# Patient Record
Sex: FEMALE | Race: WHITE | Employment: OTHER | URBAN - METROPOLITAN AREA
[De-identification: names, ages, dates, MRNs, and addresses within clinical notes are randomized per-mention and may not be internally consistent; named-entity substitution may affect disease eponyms.]

---

## 2017-04-21 ENCOUNTER — HOSPITAL ENCOUNTER (EMERGENCY)
Age: 82
Discharge: HOME | End: 2017-04-21
Attending: EMERGENCY MEDICINE
Payer: COMMERCIAL

## 2017-04-21 ENCOUNTER — APPOINTMENT (OUTPATIENT)
Dept: GENERAL RADIOLOGY | Age: 82
End: 2017-04-21
Attending: EMERGENCY MEDICINE
Payer: COMMERCIAL

## 2017-04-21 VITALS
DIASTOLIC BLOOD PRESSURE: 77 MMHG | BODY MASS INDEX: 29.41 KG/M2 | RESPIRATION RATE: 20 BRPM | HEIGHT: 66 IN | WEIGHT: 182.98 LBS | HEART RATE: 67 BPM | TEMPERATURE: 100.2 F | SYSTOLIC BLOOD PRESSURE: 127 MMHG | OXYGEN SATURATION: 95 %

## 2017-04-21 DIAGNOSIS — J06.9 ACUTE UPPER RESPIRATORY INFECTION: Primary | ICD-10-CM

## 2017-04-21 DIAGNOSIS — E86.0 DEHYDRATION: ICD-10-CM

## 2017-04-21 LAB
ALBUMIN SERPL BCP-MCNC: 3.8 G/DL (ref 3.5–5)
ALBUMIN/GLOB SERPL: 1.4 {RATIO} (ref 1.1–2.2)
ALP SERPL-CCNC: 80 U/L (ref 45–117)
ALT SERPL-CCNC: 22 U/L (ref 12–78)
ANION GAP BLD CALC-SCNC: 9 MMOL/L (ref 5–15)
APPEARANCE UR: CLEAR
AST SERPL W P-5'-P-CCNC: 16 U/L (ref 15–37)
BACTERIA URNS QL MICRO: NEGATIVE /HPF
BASOPHILS # BLD AUTO: 0.1 K/UL (ref 0–0.1)
BASOPHILS # BLD: 1 % (ref 0–1)
BILIRUB SERPL-MCNC: 0.6 MG/DL (ref 0.2–1)
BILIRUB UR QL: NEGATIVE
BUN SERPL-MCNC: 26 MG/DL (ref 6–20)
BUN/CREAT SERPL: 17 (ref 12–20)
CALCIUM SERPL-MCNC: 9.3 MG/DL (ref 8.5–10.1)
CHLORIDE SERPL-SCNC: 106 MMOL/L (ref 97–108)
CK SERPL-CCNC: 73 U/L (ref 26–192)
CO2 SERPL-SCNC: 24 MMOL/L (ref 21–32)
COLOR UR: ABNORMAL
CREAT SERPL-MCNC: 1.51 MG/DL (ref 0.55–1.02)
EOSINOPHIL # BLD: 0.1 K/UL (ref 0–0.4)
EOSINOPHIL NFR BLD: 1 % (ref 0–7)
EPITH CASTS URNS QL MICRO: ABNORMAL /LPF
ERYTHROCYTE [DISTWIDTH] IN BLOOD BY AUTOMATED COUNT: 13.9 % (ref 11.5–14.5)
FLUAV AG NPH QL IA: NEGATIVE
FLUBV AG NOSE QL IA: NEGATIVE
GLOBULIN SER CALC-MCNC: 2.8 G/DL (ref 2–4)
GLUCOSE SERPL-MCNC: 108 MG/DL (ref 65–100)
GLUCOSE UR STRIP.AUTO-MCNC: NEGATIVE MG/DL
HCT VFR BLD AUTO: 35.1 % (ref 35–47)
HGB BLD-MCNC: 11.6 G/DL (ref 11.5–16)
HGB UR QL STRIP: NEGATIVE
KETONES UR QL STRIP.AUTO: NEGATIVE MG/DL
LEUKOCYTE ESTERASE UR QL STRIP.AUTO: ABNORMAL
LYMPHOCYTES # BLD AUTO: 11 % (ref 12–49)
LYMPHOCYTES # BLD: 0.7 K/UL (ref 0.8–3.5)
MCH RBC QN AUTO: 31.2 PG (ref 26–34)
MCHC RBC AUTO-ENTMCNC: 33 G/DL (ref 30–36.5)
MCV RBC AUTO: 94.4 FL (ref 80–99)
MONOCYTES # BLD: 1 K/UL (ref 0–1)
MONOCYTES NFR BLD AUTO: 16 % (ref 5–13)
NEUTS SEG # BLD: 4.3 K/UL (ref 1.8–8)
NEUTS SEG NFR BLD AUTO: 71 % (ref 32–75)
NITRITE UR QL STRIP.AUTO: NEGATIVE
PH UR STRIP: 5.5 [PH] (ref 5–8)
PLATELET # BLD AUTO: 189 K/UL (ref 150–400)
POTASSIUM SERPL-SCNC: 4.3 MMOL/L (ref 3.5–5.1)
PROT SERPL-MCNC: 6.6 G/DL (ref 6.4–8.2)
PROT UR STRIP-MCNC: NEGATIVE MG/DL
RBC # BLD AUTO: 3.72 M/UL (ref 3.8–5.2)
RBC #/AREA URNS HPF: ABNORMAL /HPF (ref 0–5)
RBC MORPH BLD: ABNORMAL
SODIUM SERPL-SCNC: 139 MMOL/L (ref 136–145)
SP GR UR REFRACTOMETRY: 1.02 (ref 1–1.03)
TROPONIN I SERPL-MCNC: 0.04 NG/ML
UA: UC IF INDICATED,UAUC: ABNORMAL
UROBILINOGEN UR QL STRIP.AUTO: 0.2 EU/DL (ref 0.2–1)
WBC # BLD AUTO: 6.2 K/UL (ref 3.6–11)
WBC URNS QL MICRO: ABNORMAL /HPF (ref 0–4)

## 2017-04-21 PROCEDURE — 84484 ASSAY OF TROPONIN QUANT: CPT | Performed by: EMERGENCY MEDICINE

## 2017-04-21 PROCEDURE — 36415 COLL VENOUS BLD VENIPUNCTURE: CPT | Performed by: EMERGENCY MEDICINE

## 2017-04-21 PROCEDURE — 87804 INFLUENZA ASSAY W/OPTIC: CPT | Performed by: EMERGENCY MEDICINE

## 2017-04-21 PROCEDURE — 82550 ASSAY OF CK (CPK): CPT | Performed by: EMERGENCY MEDICINE

## 2017-04-21 PROCEDURE — 85025 COMPLETE CBC W/AUTO DIFF WBC: CPT | Performed by: EMERGENCY MEDICINE

## 2017-04-21 PROCEDURE — 74011000250 HC RX REV CODE- 250: Performed by: EMERGENCY MEDICINE

## 2017-04-21 PROCEDURE — 96360 HYDRATION IV INFUSION INIT: CPT

## 2017-04-21 PROCEDURE — 80053 COMPREHEN METABOLIC PANEL: CPT | Performed by: EMERGENCY MEDICINE

## 2017-04-21 PROCEDURE — 77030029684 HC NEB SM VOL KT MONA -A

## 2017-04-21 PROCEDURE — 99283 EMERGENCY DEPT VISIT LOW MDM: CPT

## 2017-04-21 PROCEDURE — 71020 XR CHEST PA LAT: CPT

## 2017-04-21 PROCEDURE — 93005 ELECTROCARDIOGRAM TRACING: CPT

## 2017-04-21 PROCEDURE — 81001 URINALYSIS AUTO W/SCOPE: CPT

## 2017-04-21 PROCEDURE — 94640 AIRWAY INHALATION TREATMENT: CPT

## 2017-04-21 PROCEDURE — 87086 URINE CULTURE/COLONY COUNT: CPT

## 2017-04-21 PROCEDURE — 74011250636 HC RX REV CODE- 250/636: Performed by: EMERGENCY MEDICINE

## 2017-04-21 RX ORDER — ALBUTEROL SULFATE 90 UG/1
2 AEROSOL, METERED RESPIRATORY (INHALATION)
Qty: 1 INHALER | Refills: 0 | Status: ON HOLD | OUTPATIENT
Start: 2017-04-21 | End: 2018-02-27

## 2017-04-21 RX ORDER — TRAZODONE HYDROCHLORIDE 50 MG/1
50 TABLET ORAL
COMMUNITY
End: 2019-06-15 | Stop reason: SDUPTHER

## 2017-04-21 RX ORDER — ALBUTEROL SULFATE 0.83 MG/ML
5 SOLUTION RESPIRATORY (INHALATION)
Status: COMPLETED | OUTPATIENT
Start: 2017-04-21 | End: 2017-04-21

## 2017-04-21 RX ORDER — ERGOCALCIFEROL 1.25 MG/1
50000 CAPSULE ORAL
COMMUNITY
End: 2019-01-09 | Stop reason: SDUPTHER

## 2017-04-21 RX ADMIN — SODIUM CHLORIDE 500 ML: 900 INJECTION, SOLUTION INTRAVENOUS at 20:24

## 2017-04-21 RX ADMIN — ALBUTEROL SULFATE 5 MG: 2.5 SOLUTION RESPIRATORY (INHALATION) at 20:58

## 2017-04-21 NOTE — ED PROVIDER NOTES
HPI Comments: Chano Marshall is a 80 y.o. female who presents ambulatory to [facility] ED with cc of progressively worsening SOB since this morning. She also notes associated leg swelling and recorded fever of 102F earlier this afternoon. Pt states that fever improved after taking Motrin 400 mg at 1600 this afternoon, but notes persistence of SOB, exacerbated by exertion. She endorses hx of similar SOB in Dec 2016 with multiple pleural effusions at that time. Pt states that she was followed by Dr. Janae Nye, pulmonology, but was unable to determine etiology of effusions. She states that symptoms seemed to resolve after a talc pleurodesis, but states that symptoms seemed to have recurred this morning. She denies any sick contacts and specifically denies any cough, rash, chest pain, dysuria, urinary frequency, abdominal pain, nausea, or vomiting. Lilyan Cockayne, MD  Pulmonology: Dr. Janae Nye    PMHx: HTN, GERD, pleural effusions  Social Hx: - smoking; - EtOH; - illicit drug use    There are no other complains, changes, or physical findings at this time. The history is provided by the patient. No  was used. Past Medical History:   Diagnosis Date    Arthritis     GERD (gastroesophageal reflux disease)     Hypertension     Psychiatric disorder     Thyroid disease        History reviewed. No pertinent surgical history. Family History:   Problem Relation Age of Onset    Hypertension Mother     Heart Disease Mother     Cancer Maternal Aunt      breast       Social History     Social History    Marital status:      Spouse name: N/A    Number of children: N/A    Years of education: N/A     Occupational History    Not on file.      Social History Main Topics    Smoking status: Never Smoker    Smokeless tobacco: Never Used    Alcohol use 0.6 oz/week     1 Glasses of wine per week      Comment: occasional    Drug use: Yes     Special: Prescription    Sexual activity: Not on file     Other Topics Concern    Not on file     Social History Narrative         ALLERGIES: Bextra [valdecoxib]    Review of Systems   Constitutional: Positive for fever. Negative for activity change and chills. HENT: Negative for congestion and sore throat. Eyes: Negative for pain and redness. Respiratory: Positive for shortness of breath. Negative for cough and chest tightness. Cardiovascular: Positive for leg swelling. Negative for chest pain and palpitations. Gastrointestinal: Negative for abdominal pain, diarrhea, nausea and vomiting. Genitourinary: Negative for dysuria, frequency and urgency. Musculoskeletal: Negative for back pain and neck pain. Skin: Negative for rash. Neurological: Negative for syncope, light-headedness and headaches. Psychiatric/Behavioral: Negative for confusion. All other systems reviewed and are negative. Vitals:    04/21/17 1637 04/21/17 1815 04/21/17 1816   BP:  136/59    Pulse: (!) 59     Resp: 20     Temp: 100.2 °F (37.9 °C)     SpO2: 96%  95%   Weight: 83 kg (182 lb 15.7 oz)     Height: 5' 6\" (1.676 m)              Physical Exam   Constitutional: She is oriented to person, place, and time. She appears well-developed and well-nourished. No distress. HENT:   Head: Normocephalic. Nose: Nose normal.   Mouth/Throat: Oropharynx is clear and moist. No oropharyngeal exudate. Eyes: Conjunctivae are normal. Pupils are equal, round, and reactive to light. No scleral icterus. Neck: Normal range of motion. Neck supple. No JVD present. No tracheal deviation present. No thyromegaly present. Cardiovascular: Normal rate, regular rhythm and intact distal pulses. Exam reveals no gallop and no friction rub. No murmur heard. Pulmonary/Chest: Effort normal. No stridor. No respiratory distress. She has no wheezes. She has rales (bibasillar). Abdominal: Soft. Bowel sounds are normal. She exhibits no distension. There is no tenderness.  There is no rebound and no guarding. Musculoskeletal: Normal range of motion. She exhibits edema. 1+ edema up to the ankle bilaterally. Lymphadenopathy:     She has no cervical adenopathy. Neurological: She is alert and oriented to person, place, and time. No cranial nerve deficit. She exhibits normal muscle tone. Coordination normal.   Skin: Skin is warm and dry. No rash noted. She is not diaphoretic. No erythema. Psychiatric: She has a normal mood and affect. Her behavior is normal.   Nursing note and vitals reviewed. MDM  Number of Diagnoses or Management Options  Acute upper respiratory infection:   Dehydration:   Diagnosis management comments: DDx: PNA, UTI, viral syndrome       Amount and/or Complexity of Data Reviewed  Clinical lab tests: reviewed and ordered  Tests in the radiology section of CPT®: ordered and reviewed  Tests in the medicine section of CPT®: ordered and reviewed  Review and summarize past medical records: yes  Independent visualization of images, tracings, or specimens: yes    Risk of Complications, Morbidity, and/or Mortality  General comments: Suspect sx due to simple uri; cxr clear; no acute ekg changes; negative troponin; vs wnl; only lgf here at 100.2; pt well appearing; good room air sats; flu negative; ua negative fo bacteria; only 5-10wbc; ucx sent; no increased wbc; reassured pt; dc home; Jan Fortune MD      Patient Progress  Patient progress: stable    ED Course       Procedures    ED EKG interpretation: 16:42  Rhythm: sinus tachycardia; and regular . Rate (approx.): 104; Axis: normal; P wave: normal; QRS interval: LBBB; ST/T wave: non-specific changes; When compared to ekg on  9/1/15, no significant change was found. This EKG was interpreted by Jan Fortune MD,ED Provider.     LABORATORY TESTS:  Recent Results (from the past 12 hour(s))   EKG, 12 LEAD, INITIAL    Collection Time: 04/21/17  4:42 PM   Result Value Ref Range    Ventricular Rate 104 BPM    Atrial Rate 117 BPM P-R Interval 192 ms    QRS Duration 126 ms    Q-T Interval 384 ms    QTC Calculation (Bezet) 504 ms    Calculated P Axis 90 degrees    Calculated R Axis 62 degrees    Calculated T Axis 60 degrees    Diagnosis       Sinus tachycardia with premature atrial complexes and premature ventricular   complexes or fusion complexes  Nonspecific intraventricular block  Cannot rule out Septal infarct , age undetermined  When compared with ECG of 01-SEP-2015 09:53,  fusion complexes are now present  premature atrial complexes are now present  T wave inversion no longer evident in Lateral leads     CBC WITH AUTOMATED DIFF    Collection Time: 04/21/17  4:48 PM   Result Value Ref Range    WBC 6.2 3.6 - 11.0 K/uL    RBC 3.72 (L) 3.80 - 5.20 M/uL    HGB 11.6 11.5 - 16.0 g/dL    HCT 35.1 35.0 - 47.0 %    MCV 94.4 80.0 - 99.0 FL    MCH 31.2 26.0 - 34.0 PG    MCHC 33.0 30.0 - 36.5 g/dL    RDW 13.9 11.5 - 14.5 %    PLATELET 852 111 - 085 K/uL    NEUTROPHILS 71 32 - 75 %    LYMPHOCYTES 11 (L) 12 - 49 %    MONOCYTES 16 (H) 5 - 13 %    EOSINOPHILS 1 0 - 7 %    BASOPHILS 1 0 - 1 %    ABS. NEUTROPHILS 4.3 1.8 - 8.0 K/UL    ABS. LYMPHOCYTES 0.7 (L) 0.8 - 3.5 K/UL    ABS. MONOCYTES 1.0 0.0 - 1.0 K/UL    ABS. EOSINOPHILS 0.1 0.0 - 0.4 K/UL    ABS. BASOPHILS 0.1 0.0 - 0.1 K/UL    RBC COMMENTS NORMOCYTIC, NORMOCHROMIC     METABOLIC PANEL, COMPREHENSIVE    Collection Time: 04/21/17  4:48 PM   Result Value Ref Range    Sodium 139 136 - 145 mmol/L    Potassium 4.3 3.5 - 5.1 mmol/L    Chloride 106 97 - 108 mmol/L    CO2 24 21 - 32 mmol/L    Anion gap 9 5 - 15 mmol/L    Glucose 108 (H) 65 - 100 mg/dL    BUN 26 (H) 6 - 20 MG/DL    Creatinine 1.51 (H) 0.55 - 1.02 MG/DL    BUN/Creatinine ratio 17 12 - 20      GFR est AA 39 (L) >60 ml/min/1.73m2    GFR est non-AA 32 (L) >60 ml/min/1.73m2    Calcium 9.3 8.5 - 10.1 MG/DL    Bilirubin, total 0.6 0.2 - 1.0 MG/DL    ALT (SGPT) 22 12 - 78 U/L    AST (SGOT) 16 15 - 37 U/L    Alk.  phosphatase 80 45 - 117 U/L Protein, total 6.6 6.4 - 8.2 g/dL    Albumin 3.8 3.5 - 5.0 g/dL    Globulin 2.8 2.0 - 4.0 g/dL    A-G Ratio 1.4 1.1 - 2.2     CK W/ REFLX CKMB    Collection Time: 04/21/17  4:48 PM   Result Value Ref Range    CK 73 26 - 192 U/L   TROPONIN I    Collection Time: 04/21/17  4:48 PM   Result Value Ref Range    Troponin-I, Qt. 0.04 <0.05 ng/mL   URINALYSIS W/ REFLEX CULTURE    Collection Time: 04/21/17  7:15 PM   Result Value Ref Range    Color YELLOW/STRAW      Appearance CLEAR CLEAR      Specific gravity 1.021 1.003 - 1.030      pH (UA) 5.5 5.0 - 8.0      Protein NEGATIVE  NEG mg/dL    Glucose NEGATIVE  NEG mg/dL    Ketone NEGATIVE  NEG mg/dL    Bilirubin NEGATIVE  NEG      Blood NEGATIVE  NEG      Urobilinogen 0.2 0.2 - 1.0 EU/dL    Nitrites NEGATIVE  NEG      Leukocyte Esterase MODERATE (A) NEG      WBC 5-10 0 - 4 /hpf    RBC 0-5 0 - 5 /hpf    Epithelial cells FEW FEW /lpf    Bacteria NEGATIVE  NEG /hpf    UA:UC IF INDICATED URINE CULTURE ORDERED (A) CNI     INFLUENZA A & B AG (RAPID TEST)    Collection Time: 04/21/17  8:26 PM   Result Value Ref Range    Influenza A Antigen NEGATIVE  NEG      Influenza B Antigen NEGATIVE  NEG         IMAGING RESULTS:  CXR Results  (Last 48 hours)               04/21/17 1953  XR CHEST PA LAT Final result    Impression:  IMPRESSION: No acute intrathoracic disease. Narrative:  CLINICAL HISTORY: Chest pain, dyspnea for 3 days, fever    INDICATION: Chest pain, dyspnea for 3 days and fever       COMPARISON: 9/16/2016       FINDINGS:    PA and lateral views of the chest are obtained. The cardiopericardial silhouette is stable. There is no pleural effusion,   pneumothorax or focal consolidation present. Patient is on a cardiac monitor.                  VITALS:  Patient Vitals for the past 12 hrs:   Temp Pulse Resp BP SpO2   04/21/17 2022 - 67 - 127/77 95 %   04/21/17 1930 - 68 - 135/59 95 %   04/21/17 1921 - - - - 95 %   04/21/17 1816 - - - - 95 %   04/21/17 1815 - - - 136/59 - 04/21/17 1637 100.2 °F (37.9 °C) (!) 59 20 - 96 %       MEDICATIONS GIVEN:  Medications   sodium chloride 0.9 % bolus infusion 500 mL (500 mL IntraVENous New Bag 4/21/17 2024)   albuterol (PROVENTIL VENTOLIN) nebulizer solution 5 mg (5 mg Nebulization Given 4/21/17 2058)       IMPRESSION:  1. Acute upper respiratory infection    2. Dehydration        PLAN:  1. Current Discharge Medication List      START taking these medications    Details   albuterol (PROVENTIL HFA, VENTOLIN HFA, PROAIR HFA) 90 mcg/actuation inhaler Take 2 Puffs by inhalation every four (4) hours as needed for Wheezing. Qty: 1 Inhaler, Refills: 0           2. Follow-up Information     None        3. Return to ED if worse     Discharge Note:  9:31 PM  The patient has been re-evaluated and is ready for discharge. Reviewed available results with patient. Counseled patient on diagnosis and care plan. Patient has expressed understanding, and all questions have been answered. Patient agrees with plan and agrees to follow up as recommended, or to return to the ED if their symptoms worsen. Discharge instructions have been provided and explained to the patient, along with reasons to return to the ED. This note is prepared by Jennifer Frost, acting as Scribe for Donna Lovelace MD.    Donna Lovelace MD: The scribe's documentation has been prepared under my direction and personally reviewed by me in its entirety. I confirm that the note above accurately reflects all work, treatment, procedures, and medical decision making performed by me.

## 2017-04-21 NOTE — ED NOTES
Pt arrives via wheelchair to room. Pt c/o fever today and shortness of breath x 3 days with exertion. Pt denies cardiac hx. Pt not short of breath at this time. Monitor x 3. Call bell within reach and plan of care discussed.

## 2017-04-22 LAB
ATRIAL RATE: 117 BPM
CALCULATED P AXIS, ECG09: 90 DEGREES
CALCULATED R AXIS, ECG10: 62 DEGREES
CALCULATED T AXIS, ECG11: 60 DEGREES
DIAGNOSIS, 93000: NORMAL
P-R INTERVAL, ECG05: 192 MS
Q-T INTERVAL, ECG07: 384 MS
QRS DURATION, ECG06: 126 MS
QTC CALCULATION (BEZET), ECG08: 504 MS
VENTRICULAR RATE, ECG03: 104 BPM

## 2017-04-22 NOTE — ED NOTES
Pt received discharge instructions from Dr. Jada Nvaarrete and verbalized understanding. Pt wheeled to exit and helped into vehicle with family.

## 2017-04-22 NOTE — DISCHARGE INSTRUCTIONS
Dehydration: Care Instructions  Your Care Instructions  Dehydration happens when your body loses too much fluid. This might happen when you do not drink enough water or you lose large amounts of fluids from your body because of diarrhea, vomiting, or sweating. Severe dehydration can be life-threatening. Water and minerals called electrolytes help put your body fluids back in balance. Learn the early signs of fluid loss, and drink more fluids to prevent dehydration. Follow-up care is a key part of your treatment and safety. Be sure to make and go to all appointments, and call your doctor if you are having problems. It's also a good idea to know your test results and keep a list of the medicines you take. How can you care for yourself at home? · To prevent dehydration, drink plenty of fluids, enough so that your urine is light yellow or clear like water. Choose water and other caffeine-free clear liquids until you feel better. If you have kidney, heart, or liver disease and have to limit fluids, talk with your doctor before you increase the amount of fluids you drink. · If you do not feel like eating or drinking, try taking small sips of water, sports drinks, or other rehydration drinks. · Get plenty of rest.  To prevent dehydration  · Add more fluids to your diet and daily routine, unless your doctor has told you not to. · During hot weather, drink more fluids. Drink even more fluids if you exercise a lot. Stay away from drinks with alcohol or caffeine. · Watch for the symptoms of dehydration. These include:  ¨ A dry, sticky mouth. ¨ Dark yellow urine, and not much of it. ¨ Dry and sunken eyes. ¨ Feeling very tired. · Learn what problems can lead to dehydration. These include:  ¨ Diarrhea, fever, and vomiting. ¨ Any illness with a fever, such as pneumonia or the flu. ¨ Activities that cause heavy sweating, such as endurance races and heavy outdoor work in hot or humid weather.   ¨ Alcohol or drug abuse or withdrawal.  ¨ Certain medicines, such as cold and allergy pills (antihistamines), diet pills (diuretics), and laxatives. ¨ Certain diseases, such as diabetes, cancer, and heart or kidney disease. When should you call for help? Call 911 anytime you think you may need emergency care. For example, call if:  · You passed out (lost consciousness). Call your doctor now or seek immediate medical care if:  · You are confused and cannot think clearly. · You are dizzy or lightheaded, or you feel like you may faint. · You have signs of needing more fluids. You have sunken eyes and a dry mouth, and you pass only a little dark urine. · You cannot keep fluids down. Watch closely for changes in your health, and be sure to contact your doctor if:  · You are not making tears. · Your skin is very dry and sags slowly back into place after you pinch it. · Your mouth and eyes are very dry. Where can you learn more? Go to http://franco-olivier.info/. Enter H399 in the search box to learn more about \"Dehydration: Care Instructions. \"  Current as of: May 27, 2016  Content Version: 11.2  © 6846-4670 [employer]. Care instructions adapted under license by [employer] (which disclaims liability or warranty for this information). If you have questions about a medical condition or this instruction, always ask your healthcare professional. [place] [address] any warranty or liability for your use of this information. Upper Respiratory Infection (Cold): Care Instructions  Your Care Instructions    An upper respiratory infection, or URI, is an infection of the nose, sinuses, or throat. URIs are spread by coughs, sneezes, and direct contact. The common cold is the most frequent kind of URI. The flu and sinus infections are other kinds of URIs. Almost all URIs are caused by viruses. Antibiotics won't cure them. But you can treat most infections with home care. This may include drinking lots of fluids and taking over-the-counter pain medicine. You will probably feel better in 4 to 10 days. The doctor has checked you carefully, but problems can develop later. If you notice any problems or new symptoms, get medical treatment right away. Follow-up care is a key part of your treatment and safety. Be sure to make and go to all appointments, and call your doctor if you are having problems. It's also a good idea to know your test results and keep a list of the medicines you take. How can you care for yourself at home? · To prevent dehydration, drink plenty of fluids, enough so that your urine is light yellow or clear like water. Choose water and other caffeine-free clear liquids until you feel better. If you have kidney, heart, or liver disease and have to limit fluids, talk with your doctor before you increase the amount of fluids you drink. · Take an over-the-counter pain medicine, such as acetaminophen (Tylenol), ibuprofen (Advil, Motrin), or naproxen (Aleve). Read and follow all instructions on the label. · Before you use cough and cold medicines, check the label. These medicines may not be safe for young children or for people with certain health problems. · Be careful when taking over-the-counter cold or flu medicines and Tylenol at the same time. Many of these medicines have acetaminophen, which is Tylenol. Read the labels to make sure that you are not taking more than the recommended dose. Too much acetaminophen (Tylenol) can be harmful. · Get plenty of rest.  · Do not smoke or allow others to smoke around you. If you need help quitting, talk to your doctor about stop-smoking programs and medicines. These can increase your chances of quitting for good. When should you call for help? Call 911 anytime you think you may need emergency care. For example, call if:  · You have severe trouble breathing.   Call your doctor now or seek immediate medical care if:  · You seem to be getting much sicker. · You have new or worse trouble breathing. · You have a new or higher fever. · You have a new rash. Watch closely for changes in your health, and be sure to contact your doctor if:  · You have a new symptom, such as a sore throat, an earache, or sinus pain. · You cough more deeply or more often, especially if you notice more mucus or a change in the color of your mucus. · You do not get better as expected. Where can you learn more? Go to http://franco-olivier.info/. Enter S674 in the search box to learn more about \"Upper Respiratory Infection (Cold): Care Instructions. \"  Current as of: June 30, 2016  Content Version: 11.2  © 2230-1984 [employer]. Care instructions adapted under license by [employer] (which disclaims liability or warranty for this information). If you have questions about a medical condition or this instruction, always ask your healthcare professional. [address] any warranty or liability for your use of this information.

## 2017-04-23 LAB
BACTERIA SPEC CULT: NORMAL
CC UR VC: NORMAL
SERVICE CMNT-IMP: NORMAL

## 2017-08-02 ENCOUNTER — OFFICE VISIT (OUTPATIENT)
Dept: INTERNAL MEDICINE CLINIC | Age: 82
End: 2017-08-02

## 2017-08-02 VITALS
TEMPERATURE: 98.2 F | DIASTOLIC BLOOD PRESSURE: 58 MMHG | BODY MASS INDEX: 31.58 KG/M2 | WEIGHT: 185 LBS | RESPIRATION RATE: 16 BRPM | HEIGHT: 64 IN | OXYGEN SATURATION: 96 % | HEART RATE: 67 BPM | SYSTOLIC BLOOD PRESSURE: 117 MMHG

## 2017-08-02 DIAGNOSIS — I10 ESSENTIAL HYPERTENSION: ICD-10-CM

## 2017-08-02 DIAGNOSIS — E78.2 MIXED HYPERLIPIDEMIA: ICD-10-CM

## 2017-08-02 DIAGNOSIS — J90 PLEURAL EFFUSION: ICD-10-CM

## 2017-08-02 DIAGNOSIS — Z00.00 ROUTINE ADULT HEALTH MAINTENANCE: Primary | ICD-10-CM

## 2017-08-02 DIAGNOSIS — M81.0 POSTMENOPAUSAL BONE LOSS: ICD-10-CM

## 2017-08-02 DIAGNOSIS — E03.9 ACQUIRED HYPOTHYROIDISM: ICD-10-CM

## 2017-08-02 DIAGNOSIS — N18.3 CHRONIC KIDNEY DISEASE (CKD), STAGE 3 (MODERATE): ICD-10-CM

## 2017-08-02 PROBLEM — R91.8 PULMONARY NODULES: Chronic | Status: ACTIVE | Noted: 2017-08-02

## 2017-08-02 RX ORDER — PENICILLIN V POTASSIUM 500 MG/1
TABLET, FILM COATED ORAL
Status: ON HOLD | COMMUNITY
Start: 2017-08-01 | End: 2018-02-27

## 2017-08-02 NOTE — PROGRESS NOTES
Reviewed record in preparation for visit and have obtained necessary documentation. Identified pt with two pt identifiers(name and ). Chief Complaint   Patient presents with   [facility] Maintenance Due   Topic Date Due    DTaP/Tdap/Td series (1 - Tdap) 1947    ZOSTER VACCINE AGE 60>  1986    GLAUCOMA SCREENING Q2Y  1991    OSTEOPOROSIS SCREENING (DEXA)  1991    Pneumococcal 65+ Low/Medium Risk (1 of 2 - PCV13) 1991    MEDICARE YEARLY EXAM  1991    INFLUENZA AGE 9 TO ADULT  2017       Ms. Gabriella Cast has a reminder for a \"due or due soon\" health maintenance. I have asked that she discuss health maintenance topic(s) due with Her  primary care provider. Coordination of Care Questionnaire:  :     1) Have you been to an emergency room, urgent care clinic since your last visit? no   Hospitalized since your last visit? no             2) Have you seen or consulted any other health care providers outside of [address] [place] [address] since your last visit? no  (Include any pap smears or colon screenings in this section.)    3) Do you have an Advance Directive on file? no    4) Are you interested in receiving information on Advance Directives? NO    Patient is accompanied by self I have received verbal consent from Russell Bah to discuss any/all medical information while they are present in the room.

## 2017-08-02 NOTE — MR AVS SNAPSHOT
Visit Information Date & Time Provider Department Dept. Phone Encounter #  
 8/2/2017 10:30 AM Marco Jack, [address] [id number] Follow-up Instructions Return in about 6 months (around 2/2/2018). Upcoming Health Maintenance Date Due DTaP/Tdap/Td series (1 - Tdap) 5/24/1947 ZOSTER VACCINE AGE 60> 3/24/1986 GLAUCOMA SCREENING Q2Y 5/24/1991 OSTEOPOROSIS SCREENING (DEXA) 5/24/1991 MEDICARE YEARLY EXAM 5/24/1991 Pneumococcal 65+ Low/Medium Risk (2 of 2 - PPSV23) 10/14/2017 Allergies as of 8/2/2017  Review Complete On: 8/2/2017 By: Jazmine Pressley III, DO Severity Noted Reaction Type Reactions Bextra [Valdecoxib]  05/11/2011    Hives No longer allergic Current Immunizations  Never Reviewed Name Date Influenza Vaccine 11/1/2013 Not reviewed this visit You Were Diagnosed With   
  
 Codes Comments Routine adult health maintenance    -  Primary ICD-10-CM: Z00.00 ICD-9-CM: V70.0 Pleural effusion     ICD-10-CM: J90 ICD-9-CM: 511.9 Essential hypertension     ICD-10-CM: I10 
ICD-9-CM: 401.9 Mixed hyperlipidemia     ICD-10-CM: E78.2 ICD-9-CM: 272.2 Acquired hypothyroidism     ICD-10-CM: E03.9 ICD-9-CM: 115. 9 Chronic kidney disease (CKD), stage 3 (moderate)     ICD-10-CM: N18.3 ICD-9-CM: 272. 3 Postmenopausal bone loss     ICD-10-CM: M81.0 ICD-9-CM: 733.01 Vitals BP Pulse Temp Resp Height(growth percentile) Weight(growth percentile) 117/58 (BP 1 Location: Right arm, BP Patient Position: Sitting) 67 98.2 °F (36.8 °C) (Oral) 16 5' 4.25\" (1.632 m) 185 lb (83.9 kg) SpO2 BMI OB Status Smoking Status 96% 31.51 kg/m2 Postmenopausal Never Smoker Vitals History BMI and BSA Data Body Mass Index Body Surface Area  
 31.51 kg/m 2 1.95 m 2 Preferred Pharmacy Pharmacy Name Phone [id number] [phone] [facility] [phone] Your Updated Medication List  
  
   
This list is accurate as of: 8/2/17 11:32 AM.  Always use your most recent med list.  
  
  
  
  
 albuterol 90 mcg/actuation inhaler Commonly known as:  PROVENTIL HFA, VENTOLIN HFA, PROAIR HFA Take 2 Puffs by inhalation every four (4) hours as needed for Wheezing. ALEVE PO Take  by mouth. aspirin delayed-release 81 mg tablet Take  by mouth daily. atorvastatin 10 mg tablet Commonly known as:  LIPITOR Take 1 tablet by mouth every other day. escitalopram oxalate 10 mg tablet Commonly known as:  Nan Ford Take 1 Tab by mouth daily. lansoprazole 30 mg capsule Commonly known as:  PREVACID Take 1 Cap by mouth Daily (before breakfast). levothyroxine 88 mcg tablet Commonly known as:  SYNTHROID  
TAKE ONE TABLET BY MOUTH ONCE DAILY BEFORE BREAKFAST  
  
 losartan-hydroCHLOROthiazide 100-25 mg per tablet Commonly known as:  HYZAAR Take 1 Tab by mouth daily. penicillin v potassium 500 mg tablet Commonly known as:  VEETID  
  
 traZODone 50 mg tablet Commonly known as:  Gleda Rico Take 50 mg by mouth nightly. VITAMIN C 500 mg tablet Generic drug:  ascorbic acid (vitamin C) Take  by mouth. VITAMIN D2 50,000 unit capsule Generic drug:  ergocalciferol Take 50,000 Units by mouth every seven (7) days. We Performed the Following LIPID PANEL [62855 CPT(R)] METABOLIC PANEL, BASIC [33315 CPT(R)] TSH 3RD GENERATION [90325 CPT(R)] Follow-up Instructions Return in about 6 months (around 2/2/2018). To-Do List   
 08/02/2017 Imaging:  DEXA BONE DENSITY STUDY AXIAL Patient Instructions Well Visit, Over 72: Care Instructions Your Care Instructions Physical exams can help you stay healthy.  Your doctor has checked your overall health and may have suggested ways to take good care of yourself. He or she also may have recommended tests. At home, you can help prevent illness with healthy eating, regular exercise, and other steps. Follow-up care is a key part of your treatment and safety. Be sure to make and go to all appointments, and call your doctor if you are having problems. It's also a good idea to know your test results and keep a list of the medicines you take. How can you care for yourself at home? · Reach and stay at a healthy weight. This will lower your risk for many problems, such as obesity, diabetes, heart disease, and high blood pressure. · Get at least 30 minutes of exercise on most days of the week. Walking is a good choice. You also may want to do other activities, such as running, swimming, cycling, or playing tennis or team sports. · Do not smoke. Smoking can make health problems worse. If you need help quitting, talk to your doctor about stop-smoking programs and medicines. These can increase your chances of quitting for good. · Protect your skin from too much sun. When you're outdoors from 10 a.m. to 4 p.m., stay in the shade or cover up with clothing and a hat with a wide brim. Wear sunglasses that block UV rays. Even when it's cloudy, put broad-spectrum sunscreen (SPF 30 or higher) on any exposed skin. · See a dentist one or two times a year for checkups and to have your teeth cleaned. · Wear a seat belt in the car. · Limit alcohol to 2 drinks a day for men and 1 drink a day for women. Too much alcohol can cause health problems. Follow your doctor's advice about when to have certain tests. These tests can spot problems early. For men and women · Cholesterol. Your doctor will tell you how often to have this done based on your overall health and other things that can increase your risk for heart attack and stroke. · Blood pressure.  Have your blood pressure checked during a routine doctor visit. Your doctor will tell you how often to check your blood pressure based on your age, your blood pressure results, and other factors. · Diabetes. Ask your doctor whether you should have tests for diabetes. · Vision. Experts recommend that you have yearly exams for glaucoma and other age-related eye problems. · Hearing. Tell your doctor if you notice any change in your hearing. You can have tests to find out how well you hear. · Colon cancer tests. Keep having colon cancer tests as your doctor recommends. You can have one of several types of tests. · Heart attack and stroke risk. At least every 4 to 6 years, you should have your risk for heart attack and stroke assessed. Your doctor uses factors such as your age, blood pressure, cholesterol, and whether you smoke or have diabetes to show what your risk for a heart attack or stroke is over the next 10 years. · Osteoporosis. Talk to your doctor about whether you should have a bone density test to find out whether you have thinning bones. Also ask your doctor about whether you should take calcium and vitamin D supplements. For women · Pap test and pelvic exam. You may no longer need a Pap test. Talk with your doctor about whether to stop or continue to have Pap tests. · Breast exam and mammogram. Ask how often you should have a mammogram, which is an X-ray of your breasts. A mammogram can spot breast cancer before it can be felt and when it is easiest to treat. · Thyroid disease. Talk to your doctor about whether to have your thyroid checked as part of a regular physical exam. Women have an increased chance of a thyroid problem. For men · Prostate exam. Talk to your doctor about whether you should have a blood test (called a PSA test) for prostate cancer. Experts disagree on whether men should have this test. Some experts recommend that you discuss the benefits and risks of the test with your doctor. · Abdominal aortic aneurysm. Ask your doctor whether you should have a test to check for an aneurysm. You may need a test if you ever smoked or if your parent, brother, sister, or child has had an aneurysm. When should you call for help? Watch closely for changes in your health, and be sure to contact your doctor if you have any problems or symptoms that concern you. Where can you learn more? Go to http://franco-olivier.info/. Enter F538 in the search box to learn more about \"Well Visit, Over 65: Care Instructions. \" Current as of: July 19, 2016 Content Version: 11.3 © 7784-2713 [employer]. Care instructions adapted under license by [employer] (which disclaims liability or warranty for this information). If you have questions about a medical condition or this instruction, always ask your healthcare professional. [address] any warranty or liability for your use of this information. Come back for fasting labs. Lab opens 8 am, mon-fri. No appt needed. Contact [facility] to receive print out of all of your vaccines and bring to us. Make appt with your eye doctor to be checked for glaucoma. Give dr Chay Lee a call to see him as well. Bring us a copy of your medical directives. Introducing [facility]! Dear Rachel : Thank you for requesting a [employer] account. Our records indicate that you already have an active [employer] account. You can access your account anytime at [email or web address] Did you know that you can access your hospital and ER discharge instructions at any time in [employer]? You can also review all of your test results from your hospital stay or ER visit. Additional Information If you have questions, please visit the Frequently Asked Questions section of the [employer] website at [email or web address] Remember, [employer] is NOT to be used for urgent needs.  For medical emergencies, dial 911. Now available from your iPhone and Android! Please provide this summary of care documentation to your next provider. Your primary care clinician is listed as Patricia Lechuga If you have any questions after today's visit, please call [phone].

## 2017-08-02 NOTE — PROGRESS NOTES
Jean Claude Lazcano is a 80 y.o. female who presents for evaluation of new pt visit. Used to see dr Kapil Pimentel, last saw him few months ago, but the wait in his office has put her off. Usually had to wait almost 2 hours to be seen. She is doing well, no complaints. A neighbor of hers saw me (Milagro Perez) recently, and suggested she come here. ROS:  Constitutional: negative for fevers, chills, anorexia and weight loss  Eyes:   negative for visual disturbance and irritation  ENT:   negative for tinnitus,sore throat,nasal congestion,ear pain,hoarseness  Respiratory:  negative for cough, hemoptysis, dyspnea,wheezing  CV:   negative for chest pain, palpitations, lower extremity edema  GI:   negative for nausea, vomiting, diarrhea, abdominal pain,melena  Genitourinary: negative for frequency, dysuria and hematuria  Musculoskel: negative for myalgias, arthralgias, back pain, muscle weakness, joint pain  Neurological:  negative for headaches, dizziness, focal weakness, numbness  Psychiatric:     Negative for depression or anxiety      Past Medical History:   Diagnosis Date    Arthritis     GERD (gastroesophageal reflux disease)     Hypertension     Psychiatric disorder     Thyroid disease        History reviewed. No pertinent surgical history. Family History   Problem Relation Age of Onset    Hypertension Mother     Heart Disease Mother     Cancer Maternal Aunt      breast       Social History     Social History    Marital status:      Spouse name: N/A    Number of children: N/A    Years of education: N/A     Occupational History    Not on file.      Social History Main Topics    Smoking status: Never Smoker    Smokeless tobacco: Never Used    Alcohol use 0.6 oz/week     1 Glasses of wine per week      Comment: occasional    Drug use: Yes     Special: Prescription    Sexual activity: Not on file     Other Topics Concern    Not on file     Social History Narrative            Visit Vitals    /58 (BP 1 Location: Right arm, BP Patient Position: Sitting)    Pulse 67    Temp 98.2 °F (36.8 °C) (Oral)    Resp 16    Ht 5' 4.25\" (1.632 m)    Wt 185 lb (83.9 kg)    SpO2 96%    BMI 31.51 kg/m2       Physical Examination:   General - Well appearing female  HEENT - PERRL, TM no erythema/opacification, normal nasal turbinates, no oropharyngeal erythema or exudate, MMM  Neck - supple, no bruits, no thyroidomegaly, no lymphadenopathy  Pulm - clear to auscultation bilaterally  Cardio - RRR, normal S1 S2, no murmur  Abd - soft, nontender, no masses, no HSM  Extrem - no edema, +2 distal pulses  Neuro-  No focal deficits, CN intact     Assessment/Plan:    1. Routine adult health maintenance--check flp, tsh  2.  ckd 3--check bmp  3. Anxiety and depression--continue with lexapro and trazodone  4.  htn--controlled with hyzaar  5. Pleural effusion--sept 2016 was drained twice, no recurrence since talc pleurodesis  6. Hypothyroid--on synthroid, check tsh  7. Pulmonary nodules--seen on ct scan, stable from 2015-16. Consider one more scan at next visit, if unchanged at that time, then no further surveliance needed. 8.  Routine adult health maintenance--dexa ordered.   She will get her vaccine records from [facility].  Also due for eye exam.      rtc 6 months        Jeannie Cords III, DO

## 2017-08-02 NOTE — PATIENT INSTRUCTIONS

## 2017-10-13 ENCOUNTER — APPOINTMENT (OUTPATIENT)
Dept: INTERNAL MEDICINE CLINIC | Age: 82
End: 2017-10-13

## 2017-10-14 LAB
BUN SERPL-MCNC: 25 MG/DL (ref 10–36)
BUN/CREAT SERPL: 20 (ref 12–28)
CALCIUM SERPL-MCNC: 10 MG/DL (ref 8.7–10.3)
CHLORIDE SERPL-SCNC: 104 MMOL/L (ref 96–106)
CHOLEST SERPL-MCNC: 158 MG/DL (ref 100–199)
CO2 SERPL-SCNC: 26 MMOL/L (ref 18–29)
CREAT SERPL-MCNC: 1.28 MG/DL (ref 0.57–1)
GLUCOSE SERPL-MCNC: 97 MG/DL (ref 65–99)
HDLC SERPL-MCNC: 45 MG/DL
LDLC SERPL CALC-MCNC: 83 MG/DL (ref 0–99)
POTASSIUM SERPL-SCNC: 4.3 MMOL/L (ref 3.5–5.2)
SODIUM SERPL-SCNC: 146 MMOL/L (ref 134–144)
TRIGL SERPL-MCNC: 151 MG/DL (ref 0–149)
TSH SERPL DL<=0.005 MIU/L-ACNC: 3.09 UIU/ML (ref 0.45–4.5)
VLDLC SERPL CALC-MCNC: 30 MG/DL (ref 5–40)

## 2017-10-16 NOTE — PROGRESS NOTES
Labs look good. Kidney function has improved some from 5 months ago. Cholesterol and thyroid tests normal.  No new recs.

## 2017-11-01 ENCOUNTER — HOSPITAL ENCOUNTER (OUTPATIENT)
Dept: BONE DENSITY | Age: 82
Discharge: HOME | End: 2017-11-01
Attending: INTERNAL MEDICINE
Payer: COMMERCIAL

## 2017-11-01 DIAGNOSIS — M81.0 POSTMENOPAUSAL BONE LOSS: ICD-10-CM

## 2017-11-01 PROCEDURE — 77080 DXA BONE DENSITY AXIAL: CPT

## 2017-11-01 NOTE — LETTER
11/3/2017 11:52 AM 
 
Ms. Harmony Martinez [address] [id number] Dear Harmony Baptiste: 
 
Please find your most recent results below. Resulted Orders DEXA BONE DENSITY STUDY AXIAL Narrative Bone Mineral Density Indication:  routine screening Age: 80 Sex: Female. Menopause status: Postmenopausal. 
Hormone replacement therapy: No  
 
Number of falls in the past year:   None. Risk factors for osteoporosis:  None. Current medication for osteoporosis: None. Comparison: None. Technique: Imaging was performed on the [facility] QDR 4500 C. Excluded sites: L3 and L4 due to spondylosis Findings: 
  
Femoral Neck:  Left Bone mineral density (gm/cm2):? 0.631 
% of peak bone mass: 74 
T-score: -2.0 Total Hip: Left Bone mineral density (gm/cm2):  0.767 
% of peak bone mass:   81 
T-score:   -1.4 Lumbar Spine:  L1-L2 Bone mineral density (gm/cm2):  0.994 
% of peak bone mass:  102 T-score:  0.1 The T score for the left distal third radius is -3.4. Impression Impression: This patient is osteoporotic using the World Health Organization criteria Recommendations: 
Therapy recommendations need to be tailored to each individual patient. Using 
the [facility]) FRAX absolute fracture algorithm, the 
[address] [place] [address] recommends beginning pharmacological therapy in 
postmenopausal women and men over the age of 48 with a 8 year probability of a 
hip fracture of >3% OR with the 10 year probability of a major osteoporotic 
fracture of >20%. Please reconsider testing based on risk factors. Currently, Medicare will only 
reimburse for a central DXA examination every two years, unless the patient is 
on chronic glucocorticoid therapy. Note: Please note that reliable, valid comparisons cannot be made between 
studies which have been performed on machines from different manufacturers.  If 
 clinically warranted, a follow up study performed at this site, on the same 
unit, would allow the most sensitive assessment of change in bone mineral 
density. RECOMMENDATIONS: 
DEXA scan with osteoporosis.  Would recommend prolia. Please call us if you decide to start treatment. Please call me if you have any questions: [phone] Sincerely, 
 
 
Dr. Gui Hancock

## 2017-11-03 NOTE — PROGRESS NOTES
I have attempted to contact this patient by phone with the following results:  DEXA scan with osteoporosis. Would recommend prolia. Results mailed to patient's home.

## 2017-11-06 ENCOUNTER — TELEPHONE (OUTPATIENT)
Dept: INTERNAL MEDICINE CLINIC | Age: 82
End: 2017-11-06

## 2017-11-06 NOTE — TELEPHONE ENCOUNTER
Spoke with patient after 2 patient identifiers being note and advised per Dr. Kayla Ahn That patient has osteoporosis from dexa scan, and she should start prolia. Patient agreed to prolia. Patient expressed understanding and has no further questions at this time.

## 2017-11-27 ENCOUNTER — HOSPITAL ENCOUNTER (OUTPATIENT)
Dept: INFUSION THERAPY | Age: 82
Discharge: HOME | End: 2017-11-27

## 2017-12-13 RX ORDER — LANSOPRAZOLE 30 MG/1
CAPSULE, DELAYED RELEASE ORAL
Qty: 90 CAP | Refills: 4 | Status: SHIPPED | OUTPATIENT
Start: 2017-12-13 | End: 2019-01-09 | Stop reason: SDUPTHER

## 2018-02-18 RX ORDER — LEVOTHYROXINE SODIUM 88 UG/1
TABLET ORAL
Qty: 30 TAB | Refills: 5 | Status: SHIPPED | OUTPATIENT
Start: 2018-02-18 | End: 2018-05-21 | Stop reason: SDUPTHER

## 2018-02-27 ENCOUNTER — HOSPITAL ENCOUNTER (OUTPATIENT)
Dept: NON INVASIVE DIAGNOSTICS | Age: 83
Discharge: HOME | End: 2018-02-28
Attending: INTERNAL MEDICINE | Admitting: INTERNAL MEDICINE
Payer: MEDICARE

## 2018-02-27 ENCOUNTER — ANESTHESIA (OUTPATIENT)
Dept: NON INVASIVE DIAGNOSTICS | Age: 83
End: 2018-02-27
Payer: MEDICARE

## 2018-02-27 ENCOUNTER — APPOINTMENT (OUTPATIENT)
Dept: GENERAL RADIOLOGY | Age: 83
End: 2018-02-27
Attending: INTERNAL MEDICINE
Payer: MEDICARE

## 2018-02-27 ENCOUNTER — ANESTHESIA EVENT (OUTPATIENT)
Dept: NON INVASIVE DIAGNOSTICS | Age: 83
End: 2018-02-27
Payer: MEDICARE

## 2018-02-27 PROBLEM — Z95.0 S/P BIVENTRICULAR CARDIAC PACEMAKER PROCEDURE: Status: ACTIVE | Noted: 2018-02-27

## 2018-02-27 PROCEDURE — C1900 LEAD, CORONARY VENOUS: HCPCS

## 2018-02-27 PROCEDURE — 76060000033 HC ANESTHESIA 1 TO 1.5 HR

## 2018-02-27 PROCEDURE — 74011250636 HC RX REV CODE- 250/636

## 2018-02-27 PROCEDURE — 77030018836 HC SOL IRR NACL ICUM -A

## 2018-02-27 PROCEDURE — 77030018729 HC ELECTRD DEFIB PAD CARD -B

## 2018-02-27 PROCEDURE — 71045 X-RAY EXAM CHEST 1 VIEW: CPT

## 2018-02-27 PROCEDURE — C1892 INTRO/SHEATH,FIXED,PEEL-AWAY: HCPCS

## 2018-02-27 PROCEDURE — 74011636320 HC RX REV CODE- 636/320

## 2018-02-27 PROCEDURE — 77030002996 HC SUT SLK J&J -A

## 2018-02-27 PROCEDURE — C1898 LEAD, PMKR, OTHER THAN TRANS: HCPCS

## 2018-02-27 PROCEDURE — A4565 SLINGS: HCPCS

## 2018-02-27 PROCEDURE — 33225 L VENTRIC PACING LEAD ADD-ON: CPT

## 2018-02-27 PROCEDURE — 77030011640 HC PAD GRND REM COVD -A

## 2018-02-27 PROCEDURE — C1894 INTRO/SHEATH, NON-LASER: HCPCS

## 2018-02-27 PROCEDURE — 77030031139 HC SUT VCRL2 J&J -A

## 2018-02-27 PROCEDURE — 74011000250 HC RX REV CODE- 250

## 2018-02-27 PROCEDURE — 77030010507 HC ADH SKN DERMBND J&J -B

## 2018-02-27 PROCEDURE — 74011250636 HC RX REV CODE- 250/636: Performed by: INTERNAL MEDICINE

## 2018-02-27 PROCEDURE — C1769 GUIDE WIRE: HCPCS

## 2018-02-27 PROCEDURE — 74011250637 HC RX REV CODE- 250/637: Performed by: INTERNAL MEDICINE

## 2018-02-27 PROCEDURE — C2621 PMKR, OTHER THAN SING/DUAL: HCPCS

## 2018-02-27 PROCEDURE — C1751 CATH, INF, PER/CENT/MIDLINE: HCPCS

## 2018-02-27 RX ORDER — FENTANYL CITRATE 50 UG/ML
INJECTION, SOLUTION INTRAMUSCULAR; INTRAVENOUS
Status: COMPLETED
Start: 2018-02-27 | End: 2018-02-27

## 2018-02-27 RX ORDER — HEPARIN SODIUM 200 [USP'U]/100ML
INJECTION, SOLUTION INTRAVENOUS
Status: COMPLETED
Start: 2018-02-27 | End: 2018-02-27

## 2018-02-27 RX ORDER — CEFAZOLIN SODIUM/WATER 2 G/20 ML
2 SYRINGE (ML) INTRAVENOUS EVERY 8 HOURS
Status: COMPLETED | OUTPATIENT
Start: 2018-02-27 | End: 2018-02-28

## 2018-02-27 RX ORDER — SODIUM CHLORIDE 0.9 % (FLUSH) 0.9 %
5-10 SYRINGE (ML) INJECTION AS NEEDED
Status: CANCELLED | OUTPATIENT
Start: 2018-02-27

## 2018-02-27 RX ORDER — ASPIRIN 81 MG/1
81 TABLET ORAL DAILY
Status: DISCONTINUED | OUTPATIENT
Start: 2018-02-27 | End: 2018-02-28 | Stop reason: HOSPADM

## 2018-02-27 RX ORDER — TRAZODONE HYDROCHLORIDE 50 MG/1
50 TABLET ORAL
Status: DISCONTINUED | OUTPATIENT
Start: 2018-02-27 | End: 2018-02-28 | Stop reason: HOSPADM

## 2018-02-27 RX ORDER — FENTANYL CITRATE 50 UG/ML
INJECTION, SOLUTION INTRAMUSCULAR; INTRAVENOUS AS NEEDED
Status: DISCONTINUED | OUTPATIENT
Start: 2018-02-27 | End: 2018-02-27 | Stop reason: HOSPADM

## 2018-02-27 RX ORDER — CEFAZOLIN SODIUM/WATER 2 G/20 ML
2 SYRINGE (ML) INTRAVENOUS ONCE
Status: COMPLETED | OUTPATIENT
Start: 2018-02-27 | End: 2018-02-27

## 2018-02-27 RX ORDER — SODIUM CHLORIDE 0.9 % (FLUSH) 0.9 %
5-10 SYRINGE (ML) INJECTION AS NEEDED
Status: DISCONTINUED | OUTPATIENT
Start: 2018-02-27 | End: 2018-02-28 | Stop reason: HOSPADM

## 2018-02-27 RX ORDER — BACITRACIN 50000 [IU]/1
INJECTION, POWDER, FOR SOLUTION INTRAMUSCULAR
Status: COMPLETED
Start: 2018-02-27 | End: 2018-02-27

## 2018-02-27 RX ORDER — SODIUM CHLORIDE 0.9 % (FLUSH) 0.9 %
5-10 SYRINGE (ML) INJECTION EVERY 8 HOURS
Status: DISCONTINUED | OUTPATIENT
Start: 2018-02-27 | End: 2018-02-28 | Stop reason: HOSPADM

## 2018-02-27 RX ORDER — SODIUM CHLORIDE 0.9 % (FLUSH) 0.9 %
5-10 SYRINGE (ML) INJECTION EVERY 8 HOURS
Status: CANCELLED | OUTPATIENT
Start: 2018-02-27

## 2018-02-27 RX ORDER — ERGOCALCIFEROL 1.25 MG/1
50000 CAPSULE ORAL
Status: DISCONTINUED | OUTPATIENT
Start: 2018-02-27 | End: 2018-02-28 | Stop reason: HOSPADM

## 2018-02-27 RX ORDER — HEPARIN SODIUM 200 [USP'U]/100ML
500 INJECTION, SOLUTION INTRAVENOUS ONCE
Status: COMPLETED | OUTPATIENT
Start: 2018-02-27 | End: 2018-02-27

## 2018-02-27 RX ORDER — FENTANYL CITRATE 50 UG/ML
25 INJECTION, SOLUTION INTRAMUSCULAR; INTRAVENOUS
Status: CANCELLED | OUTPATIENT
Start: 2018-02-27

## 2018-02-27 RX ORDER — ATORVASTATIN CALCIUM 10 MG/1
10 TABLET, FILM COATED ORAL EVERY OTHER DAY
Status: DISCONTINUED | OUTPATIENT
Start: 2018-02-27 | End: 2018-02-28 | Stop reason: HOSPADM

## 2018-02-27 RX ORDER — PROPOFOL 10 MG/ML
INJECTION, EMULSION INTRAVENOUS
Status: DISCONTINUED | OUTPATIENT
Start: 2018-02-27 | End: 2018-02-27 | Stop reason: HOSPADM

## 2018-02-27 RX ORDER — MIDAZOLAM HYDROCHLORIDE 1 MG/ML
INJECTION, SOLUTION INTRAMUSCULAR; INTRAVENOUS AS NEEDED
Status: DISCONTINUED | OUTPATIENT
Start: 2018-02-27 | End: 2018-02-27 | Stop reason: HOSPADM

## 2018-02-27 RX ORDER — HYDROMORPHONE HYDROCHLORIDE 1 MG/ML
.2-.5 INJECTION, SOLUTION INTRAMUSCULAR; INTRAVENOUS; SUBCUTANEOUS
Status: CANCELLED | OUTPATIENT
Start: 2018-02-27

## 2018-02-27 RX ORDER — MIDAZOLAM HYDROCHLORIDE 1 MG/ML
INJECTION, SOLUTION INTRAMUSCULAR; INTRAVENOUS
Status: COMPLETED
Start: 2018-02-27 | End: 2018-02-27

## 2018-02-27 RX ORDER — LIDOCAINE HYDROCHLORIDE 10 MG/ML
0.1 INJECTION, SOLUTION EPIDURAL; INFILTRATION; INTRACAUDAL; PERINEURAL AS NEEDED
Status: CANCELLED | OUTPATIENT
Start: 2018-02-27

## 2018-02-27 RX ORDER — KETAMINE HYDROCHLORIDE 100 MG/ML
INJECTION, SOLUTION INTRAMUSCULAR; INTRAVENOUS
Status: DISPENSED
Start: 2018-02-27 | End: 2018-02-27

## 2018-02-27 RX ORDER — BACITRACIN 50000 [IU]/1
50000 INJECTION, POWDER, FOR SOLUTION INTRAMUSCULAR ONCE
Status: COMPLETED | OUTPATIENT
Start: 2018-02-27 | End: 2018-02-27

## 2018-02-27 RX ORDER — ASCORBIC ACID 500 MG
500 TABLET ORAL DAILY
Status: DISCONTINUED | OUTPATIENT
Start: 2018-02-27 | End: 2018-02-28 | Stop reason: HOSPADM

## 2018-02-27 RX ORDER — PANTOPRAZOLE SODIUM 40 MG/1
40 TABLET, DELAYED RELEASE ORAL DAILY
Status: DISCONTINUED | OUTPATIENT
Start: 2018-02-28 | End: 2018-02-28 | Stop reason: HOSPADM

## 2018-02-27 RX ORDER — DIPHENHYDRAMINE HYDROCHLORIDE 50 MG/ML
12.5 INJECTION, SOLUTION INTRAMUSCULAR; INTRAVENOUS
Status: CANCELLED | OUTPATIENT
Start: 2018-02-27

## 2018-02-27 RX ORDER — ACETAMINOPHEN 325 MG/1
650 TABLET ORAL
Status: DISCONTINUED | OUTPATIENT
Start: 2018-02-27 | End: 2018-02-28 | Stop reason: HOSPADM

## 2018-02-27 RX ORDER — LIDOCAINE HYDROCHLORIDE AND EPINEPHRINE 10; 10 MG/ML; UG/ML
1-20 INJECTION, SOLUTION INFILTRATION; PERINEURAL
Status: DISCONTINUED | OUTPATIENT
Start: 2018-02-27 | End: 2018-02-28 | Stop reason: HOSPADM

## 2018-02-27 RX ORDER — SODIUM CHLORIDE, SODIUM LACTATE, POTASSIUM CHLORIDE, CALCIUM CHLORIDE 600; 310; 30; 20 MG/100ML; MG/100ML; MG/100ML; MG/100ML
25 INJECTION, SOLUTION INTRAVENOUS CONTINUOUS
Status: CANCELLED | OUTPATIENT
Start: 2018-02-27 | End: 2018-02-28

## 2018-02-27 RX ORDER — CEFAZOLIN SODIUM/WATER 2 G/20 ML
SYRINGE (ML) INTRAVENOUS
Status: COMPLETED
Start: 2018-02-27 | End: 2018-02-27

## 2018-02-27 RX ORDER — MORPHINE SULFATE 10 MG/ML
2 INJECTION, SOLUTION INTRAMUSCULAR; INTRAVENOUS
Status: CANCELLED | OUTPATIENT
Start: 2018-02-27

## 2018-02-27 RX ORDER — KETAMINE HYDROCHLORIDE 10 MG/ML
INJECTION, SOLUTION INTRAMUSCULAR; INTRAVENOUS AS NEEDED
Status: DISCONTINUED | OUTPATIENT
Start: 2018-02-27 | End: 2018-02-27 | Stop reason: HOSPADM

## 2018-02-27 RX ORDER — ONDANSETRON 2 MG/ML
4 INJECTION INTRAMUSCULAR; INTRAVENOUS AS NEEDED
Status: CANCELLED | OUTPATIENT
Start: 2018-02-27

## 2018-02-27 RX ORDER — ONDANSETRON 2 MG/ML
INJECTION INTRAMUSCULAR; INTRAVENOUS AS NEEDED
Status: DISCONTINUED | OUTPATIENT
Start: 2018-02-27 | End: 2018-02-27 | Stop reason: HOSPADM

## 2018-02-27 RX ORDER — ESCITALOPRAM OXALATE 10 MG/1
10 TABLET ORAL DAILY
Status: DISCONTINUED | OUTPATIENT
Start: 2018-02-27 | End: 2018-02-28 | Stop reason: HOSPADM

## 2018-02-27 RX ORDER — LEVOTHYROXINE SODIUM 88 UG/1
88 TABLET ORAL
Status: DISCONTINUED | OUTPATIENT
Start: 2018-02-28 | End: 2018-02-28 | Stop reason: HOSPADM

## 2018-02-27 RX ORDER — OXYCODONE AND ACETAMINOPHEN 5; 325 MG/1; MG/1
1 TABLET ORAL
Status: DISCONTINUED | OUTPATIENT
Start: 2018-02-27 | End: 2018-02-28 | Stop reason: HOSPADM

## 2018-02-27 RX ORDER — SODIUM CHLORIDE 9 MG/ML
INJECTION, SOLUTION INTRAVENOUS
Status: DISCONTINUED | OUTPATIENT
Start: 2018-02-27 | End: 2018-02-27 | Stop reason: HOSPADM

## 2018-02-27 RX ORDER — LIDOCAINE HYDROCHLORIDE 20 MG/ML
INJECTION, SOLUTION EPIDURAL; INFILTRATION; INTRACAUDAL; PERINEURAL AS NEEDED
Status: DISCONTINUED | OUTPATIENT
Start: 2018-02-27 | End: 2018-02-27 | Stop reason: HOSPADM

## 2018-02-27 RX ORDER — NAPROXEN SODIUM 220 MG
220 TABLET ORAL
Status: DISCONTINUED | OUTPATIENT
Start: 2018-02-27 | End: 2018-02-28 | Stop reason: HOSPADM

## 2018-02-27 RX ORDER — LIDOCAINE HYDROCHLORIDE AND EPINEPHRINE 10; 10 MG/ML; UG/ML
INJECTION, SOLUTION INFILTRATION; PERINEURAL
Status: COMPLETED
Start: 2018-02-27 | End: 2018-02-27

## 2018-02-27 RX ORDER — SODIUM CHLORIDE, SODIUM LACTATE, POTASSIUM CHLORIDE, CALCIUM CHLORIDE 600; 310; 30; 20 MG/100ML; MG/100ML; MG/100ML; MG/100ML
25 INJECTION, SOLUTION INTRAVENOUS CONTINUOUS
Status: CANCELLED | OUTPATIENT
Start: 2018-02-27

## 2018-02-27 RX ADMIN — MIDAZOLAM HYDROCHLORIDE 0.5 MG: 1 INJECTION, SOLUTION INTRAMUSCULAR; INTRAVENOUS at 09:16

## 2018-02-27 RX ADMIN — FENTANYL CITRATE 10 MCG: 50 INJECTION, SOLUTION INTRAMUSCULAR; INTRAVENOUS at 09:03

## 2018-02-27 RX ADMIN — BACITRACIN 50000 UNITS: 5000 INJECTION, POWDER, FOR SOLUTION INTRAMUSCULAR at 09:33

## 2018-02-27 RX ADMIN — LIDOCAINE HYDROCHLORIDE AND EPINEPHRINE 20 MG: 10; 10 INJECTION, SOLUTION INFILTRATION; PERINEURAL at 08:46

## 2018-02-27 RX ADMIN — MIDAZOLAM HYDROCHLORIDE 0.5 MG: 1 INJECTION, SOLUTION INTRAMUSCULAR; INTRAVENOUS at 08:32

## 2018-02-27 RX ADMIN — FENTANYL CITRATE 10 MCG: 50 INJECTION, SOLUTION INTRAMUSCULAR; INTRAVENOUS at 08:38

## 2018-02-27 RX ADMIN — ONDANSETRON 4 MG: 2 INJECTION INTRAMUSCULAR; INTRAVENOUS at 09:39

## 2018-02-27 RX ADMIN — OXYCODONE HYDROCHLORIDE AND ACETAMINOPHEN 500 MG: 500 TABLET ORAL at 16:34

## 2018-02-27 RX ADMIN — IOPAMIDOL 25 ML: 755 INJECTION, SOLUTION INTRAVENOUS at 09:33

## 2018-02-27 RX ADMIN — LIDOCAINE HYDROCHLORIDE 10 MG: 20 INJECTION, SOLUTION EPIDURAL; INFILTRATION; INTRACAUDAL; PERINEURAL at 08:29

## 2018-02-27 RX ADMIN — ATORVASTATIN CALCIUM 10 MG: 10 TABLET, FILM COATED ORAL at 13:40

## 2018-02-27 RX ADMIN — KETAMINE HYDROCHLORIDE 10 MG: 10 INJECTION, SOLUTION INTRAMUSCULAR; INTRAVENOUS at 09:32

## 2018-02-27 RX ADMIN — ASPIRIN 81 MG: 81 TABLET, COATED ORAL at 13:40

## 2018-02-27 RX ADMIN — HEPARIN SODIUM 1000 UNITS: 200 INJECTION, SOLUTION INTRAVENOUS at 08:36

## 2018-02-27 RX ADMIN — Medication 10 ML: at 16:58

## 2018-02-27 RX ADMIN — SODIUM CHLORIDE: 9 INJECTION, SOLUTION INTRAVENOUS at 08:29

## 2018-02-27 RX ADMIN — BACITRACIN 50000 UNITS: 50000 INJECTION, POWDER, FOR SOLUTION INTRAMUSCULAR at 09:33

## 2018-02-27 RX ADMIN — KETAMINE HYDROCHLORIDE 10 MG: 10 INJECTION, SOLUTION INTRAMUSCULAR; INTRAVENOUS at 08:40

## 2018-02-27 RX ADMIN — Medication 10 ML: at 21:43

## 2018-02-27 RX ADMIN — Medication 2 G: at 08:34

## 2018-02-27 RX ADMIN — FENTANYL CITRATE 10 MCG: 50 INJECTION, SOLUTION INTRAMUSCULAR; INTRAVENOUS at 09:32

## 2018-02-27 RX ADMIN — PROPOFOL 30 MCG/KG/MIN: 10 INJECTION, EMULSION INTRAVENOUS at 08:35

## 2018-02-27 RX ADMIN — FENTANYL CITRATE 10 MCG: 50 INJECTION, SOLUTION INTRAMUSCULAR; INTRAVENOUS at 08:32

## 2018-02-27 RX ADMIN — TRAZODONE HYDROCHLORIDE 50 MG: 50 TABLET ORAL at 21:43

## 2018-02-27 RX ADMIN — LIDOCAINE HYDROCHLORIDE,EPINEPHRINE BITARTRATE 20 MG: 10; .01 INJECTION, SOLUTION INFILTRATION; PERINEURAL at 08:46

## 2018-02-27 RX ADMIN — Medication 2 G: at 16:58

## 2018-02-27 RX ADMIN — KETAMINE HYDROCHLORIDE 10 MG: 10 INJECTION, SOLUTION INTRAMUSCULAR; INTRAVENOUS at 09:05

## 2018-02-27 RX ADMIN — Medication 10 ML: at 13:41

## 2018-02-27 RX ADMIN — ESCITALOPRAM OXALATE 10 MG: 10 TABLET ORAL at 13:40

## 2018-02-27 NOTE — IP AVS SNAPSHOT
[address] [facility] 
[phone] Patient: Mik Valdivia MRN: [id number] :1926 A check leann indicates which time of day the medication should be taken. My Medications CONTINUE taking these medications Instructions Each Dose to Equal  
 Morning Noon Evening Bedtime ALEVE PO Your last dose was: Your next dose is: Take  by mouth. aspirin delayed-release 81 mg tablet Your last dose was: Your next dose is: Take  by mouth daily. atorvastatin 10 mg tablet Commonly known as:  LIPITOR Your last dose was: Your next dose is: Take 1 tablet by mouth every other day. 10 mg  
    
   
   
   
  
 escitalopram oxalate 10 mg tablet Commonly known as:  Radu Salvia Your last dose was: Your next dose is: Take 1 Tab by mouth daily. 10 mg  
    
   
   
   
  
 lansoprazole 30 mg capsule Commonly known as:  PREVACID Your last dose was: Your next dose is: TAKE 1 CAPSULE BY MOUTH EVERY MORNING  
     
   
   
   
  
 levothyroxine 88 mcg tablet Commonly known as:  SYNTHROID Your last dose was: Your next dose is: TAKE 1 TABLET BY MOUTH EVERY DAY  
     
   
   
   
  
 losartan-hydroCHLOROthiazide 100-25 mg per tablet Commonly known as:  HYZAAR Your last dose was: Your next dose is: Take 1 Tab by mouth daily. 1 Tab  
    
   
   
   
  
 traZODone 50 mg tablet Commonly known as:  Luis Staton Your last dose was: Your next dose is: Take 50 mg by mouth nightly. 50 mg  
    
   
   
   
  
 VITAMIN C 500 mg tablet Generic drug:  ascorbic acid (vitamin C) Your last dose was: Your next dose is: Take  by mouth. VITAMIN D2 50,000 unit capsule Generic drug:  ergocalciferol Your last dose was: Your next dose is: Take 50,000 Units by mouth every seven (7) days. [address]67 Units

## 2018-02-27 NOTE — ANESTHESIA PREPROCEDURE EVALUATION
Anesthetic History   No history of anesthetic complications            Review of Systems / Medical History  Patient summary reviewed, nursing notes reviewed and pertinent labs reviewed    Pulmonary          Shortness of breath         Neuro/Psych         Psychiatric history     Cardiovascular    Hypertension: well controlled          Past MI, CAD and hyperlipidemia    Exercise tolerance: <4 METS  Comments: ECHO from 12/17 showed a 40% EF with mild to moderate AR and TR and moderate MR  [place]   GI/Hepatic/Renal     GERD: well controlled    Renal disease: CRI       Endo/Other      Hypothyroidism: well controlled  Obesity and arthritis     Other Findings   Comments:   Pleural effusion      Pulmonary nodules            Physical Exam    Airway  Mallampati: II  TM Distance: 4 - 6 cm  Neck ROM: normal range of motion   Mouth opening: Normal     Cardiovascular    Rhythm: regular  Rate: normal         Dental    Dentition: Caps/crowns     Pulmonary  Breath sounds clear to auscultation               Abdominal  GI exam deferred       Other Findings            Anesthetic Plan    ASA: 3  Anesthesia type: general and total IV anesthesia    Monitoring Plan: BIS      Induction: Intravenous  Anesthetic plan and risks discussed with: Patient and Son / Daughter

## 2018-02-27 NOTE — PROGRESS NOTES
TRANSFER - IN REPORT:    Verbal report received from Salma Acevedo CRNA and Debby Rivero RN on Cheree Hamman  being received from  for routine progression of care. Report consisted of patients Situation, Background, Assessment and Recommendations(SBAR). Information from the following report(s) Procedure Summary and MAR was reviewed with the receiving clinician. Opportunity for questions and clarification was provided. Assessment completed upon patients arrival to [address] [place] [address] and care assumed. Cardiac Cath Lab Recovery Arrival Note:    Cheree Hamman arrived to [facility] recovery area. Patient procedure= BiV PPI. Patient on cardiac monitor, non-invasive blood pressure, SPO2 monitor. On room air. Patient status doing well without problems. Patient is A&Ox 3. Patient reports no c/o. PROCEDURE SITE CHECK:    Procedure site:without any bleeding and no hematoma, no pain/discomfort reported at procedure site. No change in patient status. Continue to monitor patient and status.

## 2018-02-27 NOTE — ANESTHESIA POSTPROCEDURE EVALUATION
Post-Anesthesia Evaluation and Assessment    Patient: Dannie Griffith MRN: [id number]  SSN: [id number]    YOB: 1926  Age: 80 y.o. Sex: female       Cardiovascular Function/Vital Signs  Visit Vitals    /41    Pulse 60    Temp 36.4 °C (97.5 °F)    Resp 14    Ht 5' 5\" (1.651 m)    Wt 79.4 kg (175 lb)    SpO2 99%    Breastfeeding No    BMI 29.12 kg/m2       Patient is status post general, total IV anesthesia anesthesia for * No procedures listed *. Nausea/Vomiting: None    Postoperative hydration reviewed and adequate. Pain:  Pain Scale 1: Numeric (0 - 10) (02/27/18 1010)  Pain Intensity 1: 0 (02/27/18 1010)   Managed    Neurological Status: At baseline    Mental Status and Level of Consciousness: Arousable    Pulmonary Status:   O2 Device: Room air (02/27/18 1010)   Adequate oxygenation and airway patent    Complications related to anesthesia: None    Post-anesthesia assessment completed.  No concerns    Signed By: Jeffrey Hernandez MD     February 27, 2018

## 2018-02-27 NOTE — PROCEDURES
[address]  [phone]    Patient ID:  Patient: Shruthi Mccracken  MRN: [id number]  Age: 80 y.o.  : 1926  Gender: female  Study Date: 2018    History: This is a female with a chronic nonischemic cardiomyopathy EF 10%, chronic systolic congestive heart failure class 3, and chronic left bundle branch block 135 msec, and an optimized heart failure regimen. She is not a candidate for a defibrillator but is a candidate for cardiac resynchronization therapy. Procedures Performed:  1. Pacemaker Implant, right atrial and ventricular leads (78143)  2. LV lead implant (43916)    The patient was brought to the EP lab in a postabsorptive state after informed consent had been previously obtained. Continuous electrocardiographic and hemodynamic monitoring was performed. Sedation was performed by the anesthesiologist who was in constant attendance throughout the procedure. Using 1% lidocaine with epinephrine, the left chest site was anesthetized. The pocket was formed in the usual fashion and ultimately axillary venous access was obtained using a micropuncture needle x3. Three safe sheaths were placed. The tined right ventricular lead (8988-58 cm) was advanced to the RV apex. The right atrial lead (7854-52 cm) was advanced to the RA appendage. Each lead was fixed and tested, performance verified. The LV lead delivery system and hydrophilic J-tipped wire was used to access the coronary sinus ostium. Contrast venography of the CS was performed to delineate target branches. Ultimately, the quadripolar LV lead (2440-88 cm) was advanced into the target branch and tested. Final configuration to be decided after vector testing. No diaphragmatic stimulation at maximum output. The leads were anchored to the pocket floor using two 0-silk sutures at each anchor sleeve.   The pulse generator was connected to the leads and placed in the pocket after hemostasis was confirmed. Vigorous irrigation with antibiotic solution was performed. A single 0-silk suture was used to anchor the pulse generator to the pocket floor. The pocket was closed using a running 2-0 Vicryl layer x1, followed by a more superficial layer of running 4-0 Vicryl in a subcuticular fashion to close the skin. Final fluoroscopic check revealed adequate redundancy of the leads and the absence of pneumothorax. Dermabond was applied. Preoperative Diagnosis: As above. Postoperative Diagnosis: As above. Procedure:  As above. Surgeon(s) and Role:  Vincent Alves MD - Primary   Anesthesia:   MAC by the anesthesiologist.  Estimated Blood Loss:  <5 cc. Specimens: * No specimens in log *   Findings:  As below. Complications:  None. X-ray time:12.0 min  Contrast:  25 cc    SETTINGS:  [facility] CRT-P MRI SureScan [id number], [id number]  RA 3.4, 0.4, 848  RV 16, 1.3, 1823  LV -, 1.9, 848  DDDR       Recommendations:  After successful BIV pacemaker placement at the left chest using transvenous leads, routine follow-up in 2-4 weeks for wound and device check.     Signed:  Vincent Alves MD

## 2018-02-27 NOTE — PROGRESS NOTES
EP/ End of Procedure/ TRANSFER - OUT REPORT:    Verbal report given to [facility] on Mal Mais for routine progression of care       Report consisted of patients Situation, Background, Assessment and   Recommendations(SBAR). Information from the following report(s) SBAR, Kardex, Procedure Summary and MAR was reviewed with the receiving nurse. Opportunity for questions and clarification was provided.

## 2018-02-27 NOTE — IP AVS SNAPSHOT
[address] [facility] [address] [facility] 
[phone] Patient: Tiffanie Phelps MRN: [id number] About your hospitalization You were admitted on:  2018 You last received care in the:  [facility] CARDIO You were discharged on:  2018 Why you were hospitalized Your primary diagnosis was:  Not on File Your diagnoses also included:  S/P Biventricular Cardiac Pacemaker Procedure Follow-up Information Follow up With Details Comments Contact Info Janis Dahl DO   [facility] [address] [facility] 
[phone] Discharge Orders None A check leann indicates which time of day the medication should be taken. My Medications CONTINUE taking these medications Instructions Each Dose to Equal  
 Morning Noon Evening Bedtime ALEVE PO Your last dose was: Your next dose is: Take  by mouth. aspirin delayed-release 81 mg tablet Your last dose was: Your next dose is: Take  by mouth daily. atorvastatin 10 mg tablet Commonly known as:  LIPITOR Your last dose was: Your next dose is: Take 1 tablet by mouth every other day. 10 mg  
    
   
   
   
  
 escitalopram oxalate 10 mg tablet Commonly known as:  Shannon Caroli Your last dose was: Your next dose is: Take 1 Tab by mouth daily. 10 mg  
    
   
   
   
  
 lansoprazole 30 mg capsule Commonly known as:  PREVACID Your last dose was: Your next dose is: TAKE 1 CAPSULE BY MOUTH EVERY MORNING  
     
   
   
   
  
 levothyroxine 88 mcg tablet Commonly known as:  SYNTHROID Your last dose was: Your next dose is:  TAKE 1 TABLET BY MOUTH EVERY DAY  
     
   
   
   
  
 losartan-hydroCHLOROthiazide 100-25 mg per tablet Commonly known as:  HYZAAR Your last dose was: Your next dose is: Take 1 Tab by mouth daily. 1 Tab  
    
   
   
   
  
 traZODone 50 mg tablet Commonly known as:  Tram Mcdaniel Your last dose was: Your next dose is: Take 50 mg by mouth nightly. 50 mg  
    
   
   
   
  
 VITAMIN C 500 mg tablet Generic drug:  ascorbic acid (vitamin C) Your last dose was: Your next dose is: Take  by mouth. VITAMIN D2 50,000 unit capsule Generic drug:  ergocalciferol Your last dose was: Your next dose is: Take 50,000 Units by mouth every seven (7) days. 40091 Units Discharge Instructions DISCHARGE INSTRUCTIONS FOR PATIENTS WITH PACEMAKERS You had a Medronic biventricular pacemaker implanted as a treatment for heart failure above your oral medications. Will see if you start to breath easier over time. 1. Remember your appointment in about 3 weeks (call [phone] if anything changes) to check healing and implant programming with Dr. Annis Bumpers nurse, Betsy Allen. You may alternately follow=up with Dr. Batsheva Cassidy, your regular cardiologist for this, please call his office to notify and schedule if needed. You will follow-up with him as usual as your general cardiologist. 
2. [address] are available from your pharmacist to wear at all times if you choose to wear one. 3. Carry your ID card for pacemaker with you at all times. This card will be given to you in the hospital or mailed to you. 4. The pacemaker will bulge slightly under your skin. The bulge will decrease in size over the next few weeks. Please notify the doctor's office if you notice any of the following around your site: A.  A bruise that does not go away. B.  Soreness or yellow, green, or brown drainage from the site. C.  Any swelling from the site. D. If you have a fever of 100 degrees or higher that lasts for a few days. INCISION CARE 1.  Leave skin glue over your site until it starts to fall off, usually in a few weeks. 2.  You may shower after 3 days as long as your incision isnt submerged or directly sprayed upon until well healed. 3.  For comfort, wear loose fitting clothing. 4.  Report any signs of infection, fever, pain, swelling, redness, oozing, or heat at site especially if these symptoms increase after the first 3 to 4 days. ACTIVITY PRECAUTIONS 1. Avoid rough contact with the implant site. 2. No driving for 14 days. 3. Avoid lifting your arm over your head, carrying anything on the affected side, or lifting over 10 pounds for 30 days. For the first 2 days only bend your arm at the elbow. 4. Any extreme activity such as golf, weight lifting or exercise biking should be restricted for 60 days. 5. Do not carry objects by holding them against your implant site. 6.  No shooting rifles or any type of gun with the affected shoulder permanently. SPECIAL PRECAUTIONS 1. You should avoid all strong magnetic fields, such as arc welding, large transformers, large motors. 2.  You may have an MRI which uses a strong magnet to take pictures if a radiologist and cardiologist give the OK. 3.  Treatments or surgery that requires diathermy or electrocautery should be discussed with your doctor before scheduled. 4. Avoid radio frequency transmitters, including radar. 5. Advise dentist or other medical personnel you see that you have a pacemaker. 6.  Cell phones and microwave oven use is okay. 7.  If you plan to move or take a trip to a new area, the doctor's office will give you a name of a doctor to contact for any problems. ANTIBIOTIC THERAPY During the first 8 weeks after your pacemaker insertion, you may need antibiotics before any dental work or certain tests or operations.   Let the dentist or doctor who is caring for you know that you have had an implanted device. Signed: 
Pineda Comer MD 
 
 
  
  
  
Introducing [address]! Dear Pranav Butt: Thank you for requesting a Zextit account. Our records indicate that you already have an active Zextit account. You can access your account anytime at [email or web address] Did you know that you can access your hospital and ER discharge instructions at any time in Zextit? You can also review all of your test results from your hospital stay or ER visit. Additional Information If you have questions, please visit the Frequently Asked Questions section of the Zextit website at [email or web address] Remember, Zextit is NOT to be used for urgent needs. For medical emergencies, dial 911. Now available from your iPhone and Android! Providers Seen During Your Hospitalization Provider Specialty Primary office phone Melba Sanderson MD Cardiology [phone] Your Primary Care Physician (PCP) Primary Care Physician Office Phone Office Fax Shauna WILSON [phone] You are allergic to the following Allergen Reactions Bextra (Valdecoxib) Hives No longer allergic Recent Documentation Height Weight Breastfeeding? BMI OB Status Smoking Status 1.651 m 79.4 kg No 29.12 kg/m2 Postmenopausal Never Smoker Emergency Contacts Name Discharge Info Relation Home Work Mobile Deep Hernandez DISCHARGE CAREGIVER [3] Son [22]   [phone] Patient Belongings The following personal items are in your possession at time of discharge: 
  Dental Appliances: None  Visual Aid: Glasses, With patient      Home Medications: None   Jewelry: None  Clothing: At bedside    Other Valuables: None  Personal Items Sent to Safe: none Please provide this summary of care documentation to your next provider. Signatures-by signing, you are acknowledging that this After Visit Summary has been reviewed with you and you have received a copy. Patient Signature:  ____________________________________________________________ Date:  ____________________________________________________________  
  
Nito November Provider Signature:  ____________________________________________________________ Date:  ____________________________________________________________

## 2018-02-27 NOTE — PROGRESS NOTES
PROCEDURE SITE CHECK:    Procedure Site check: No bleeding, no hematoma, no pain/discomfort reported. No change in patient status. Continue to monitor patient and status. Left arm in sling. Patient ate meal tray with assist/ without incident. all belongings sent with patient at time of transfer.

## 2018-02-28 VITALS
TEMPERATURE: 97.4 F | HEIGHT: 65 IN | BODY MASS INDEX: 29.16 KG/M2 | OXYGEN SATURATION: 100 % | WEIGHT: 175 LBS | DIASTOLIC BLOOD PRESSURE: 44 MMHG | RESPIRATION RATE: 16 BRPM | HEART RATE: 77 BPM | SYSTOLIC BLOOD PRESSURE: 124 MMHG

## 2018-02-28 PROCEDURE — 74011250637 HC RX REV CODE- 250/637: Performed by: INTERNAL MEDICINE

## 2018-02-28 PROCEDURE — 74011250636 HC RX REV CODE- 250/636: Performed by: INTERNAL MEDICINE

## 2018-02-28 RX ADMIN — HYDROCHLOROTHIAZIDE: 25 TABLET ORAL at 08:41

## 2018-02-28 RX ADMIN — Medication 2 G: at 00:16

## 2018-02-28 RX ADMIN — PANTOPRAZOLE SODIUM 40 MG: 40 TABLET, DELAYED RELEASE ORAL at 08:40

## 2018-02-28 RX ADMIN — ASPIRIN 81 MG: 81 TABLET, COATED ORAL at 08:40

## 2018-02-28 RX ADMIN — Medication 10 ML: at 05:31

## 2018-02-28 RX ADMIN — LEVOTHYROXINE SODIUM 88 MCG: 88 TABLET ORAL at 08:40

## 2018-02-28 RX ADMIN — ESCITALOPRAM OXALATE 10 MG: 10 TABLET ORAL at 08:40

## 2018-02-28 NOTE — DISCHARGE INSTRUCTIONS
DISCHARGE INSTRUCTIONS FOR PATIENTS WITH PACEMAKERS    You had a Medronic biventricular pacemaker implanted as a treatment for heart failure above your oral medications. Will see if you start to breath easier over time. 1. Remember your appointment in about 3 weeks (call [phone] if anything changes) to check healing and implant programming with Dr. Rossy Myers nurse, Olga Lidia Franco. You may alternately follow=up with Dr. Diallo Saucedo, your regular cardiologist for this, please call his office to notify and schedule if needed. You will follow-up with him as usual as your general cardiologist.  2. [address] are available from your pharmacist to wear at all times if you choose to wear one. 3. Carry your ID card for pacemaker with you at all times. This card will be given to you in the hospital or mailed to you. 4. The pacemaker will bulge slightly under your skin. The bulge will decrease in size over the next few weeks. Please notify the doctor's office if you notice any of the following around your site:   A.  A bruise that does not go away. B.  Soreness or yellow, green, or brown drainage from the site. C. Any swelling from the site. D. If you have a fever of 100 degrees or higher that lasts for a few days. INCISION CARE       1.  Leave skin glue over your site until it starts to fall off, usually in a few weeks. 2.  You may shower after 3 days as long as your incision isnt submerged or directly sprayed upon until well healed. 3.  For comfort, wear loose fitting clothing. 4.  Report any signs of infection, fever, pain, swelling, redness, oozing, or heat at site especially if these symptoms increase after the first 3 to 4 days. ACTIVITY PRECAUTIONS     1. Avoid rough contact with the implant site. 2. No driving for 14 days. 3. Avoid lifting your arm over your head, carrying anything on the affected side, or lifting over 10 pounds for 30 days.   For the first 2 days only bend your arm at the elbow. 4. Any extreme activity such as golf, weight lifting or exercise biking should be restricted for 60 days. 5. Do not carry objects by holding them against your implant site. 6.  No shooting rifles or any type of gun with the affected shoulder permanently. SPECIAL PRECAUTIONS     1. You should avoid all strong magnetic fields, such as arc welding, large transformers, large motors. 2.  You may have an MRI which uses a strong magnet to take pictures if a radiologist and cardiologist give the OK. 3.  Treatments or surgery that requires diathermy or electrocautery should be discussed with your doctor before scheduled. 4. Avoid radio frequency transmitters, including radar. 5. Advise dentist or other medical personnel you see that you have a pacemaker. 6.  Cell phones and microwave oven use is okay. 7.  If you plan to move or take a trip to a new area, the doctor's office will give you a name of a doctor to contact for any problems. ANTIBIOTIC THERAPY    During the first 8 weeks after your pacemaker insertion, you may need antibiotics before any dental work or certain tests or operations. Let the dentist or doctor who is caring for you know that you have had an implanted device.       Signed:  Ronnie Smith MD

## 2018-02-28 NOTE — PROGRESS NOTES
POD#1 site and device programming check OK. S/p Medtronic BIV pacemaker yesterday. Dermabond intact. No hematoma. Ambulatory and taking oral.      Visit Vitals    /68 (BP 1 Location: Right arm, BP Patient Position: At rest)    Pulse 72    Temp 97.9 °F (36.6 °C)    Resp 16    Ht 5' 5\" (1.651 m)    Wt 79.4 kg (175 lb)    SpO2 98%    Breastfeeding No    BMI 29.12 kg/m2       ND, NAD. L chest site OK. Expected bruising. RRR, no rub. Lungs CTAB anteriorly. No unilateral arm edema. Awake, appropriate, neuro grossly nonfocal.      PLAN:  Discharge to home with F/U in the office for wound and device programming check in 3 weeks (she says she has an appt already). All questions answered. Patient is aware of signs and sx warranting urgent med F/U or calling 911.     Signed:  Ronnie Smith MD

## 2018-05-21 RX ORDER — LEVOTHYROXINE SODIUM 88 UG/1
TABLET ORAL
Qty: 90 TAB | Refills: 3 | Status: SHIPPED | OUTPATIENT
Start: 2018-05-21 | End: 2019-06-15 | Stop reason: SDUPTHER

## 2018-06-13 ENCOUNTER — HOSPITAL ENCOUNTER (OUTPATIENT)
Dept: GENERAL RADIOLOGY | Age: 83
Discharge: HOME | End: 2018-06-13
Payer: COMMERCIAL

## 2018-06-13 DIAGNOSIS — R06.00 DYSPNEA: ICD-10-CM

## 2018-06-13 PROCEDURE — 71046 X-RAY EXAM CHEST 2 VIEWS: CPT

## 2018-08-28 ENCOUNTER — HOSPITAL ENCOUNTER (OUTPATIENT)
Dept: GENERAL RADIOLOGY | Age: 83
Discharge: HOME | End: 2018-08-28
Payer: MEDICARE

## 2018-08-28 DIAGNOSIS — R93.89 ABNORMAL CT SCAN: ICD-10-CM

## 2018-08-28 PROCEDURE — 71046 X-RAY EXAM CHEST 2 VIEWS: CPT

## 2019-01-09 ENCOUNTER — OFFICE VISIT (OUTPATIENT)
Dept: INTERNAL MEDICINE CLINIC | Age: 84
End: 2019-01-09

## 2019-01-09 VITALS
OXYGEN SATURATION: 97 % | RESPIRATION RATE: 22 BRPM | HEART RATE: 87 BPM | BODY MASS INDEX: 28.49 KG/M2 | SYSTOLIC BLOOD PRESSURE: 125 MMHG | DIASTOLIC BLOOD PRESSURE: 72 MMHG | HEIGHT: 65 IN | WEIGHT: 171 LBS | TEMPERATURE: 97.6 F

## 2019-01-09 DIAGNOSIS — R06.02 SHORTNESS OF BREATH: Primary | ICD-10-CM

## 2019-01-09 DIAGNOSIS — E03.9 ACQUIRED HYPOTHYROIDISM: ICD-10-CM

## 2019-01-09 DIAGNOSIS — R06.02 SOB (SHORTNESS OF BREATH) ON EXERTION: Primary | ICD-10-CM

## 2019-01-09 DIAGNOSIS — E78.2 MIXED HYPERLIPIDEMIA: ICD-10-CM

## 2019-01-09 DIAGNOSIS — Z23 ENCOUNTER FOR IMMUNIZATION: ICD-10-CM

## 2019-01-09 DIAGNOSIS — J90 PLEURAL EFFUSION: ICD-10-CM

## 2019-01-09 DIAGNOSIS — I10 ESSENTIAL HYPERTENSION: ICD-10-CM

## 2019-01-09 RX ORDER — CARVEDILOL 3.12 MG/1
TABLET ORAL
Refills: 3 | COMMUNITY
Start: 2018-11-19

## 2019-01-09 RX ORDER — BUMETANIDE 1 MG/1
TABLET ORAL
Refills: 3 | COMMUNITY
Start: 2018-12-05 | End: 2020-06-09

## 2019-01-09 RX ORDER — LANSOPRAZOLE 30 MG/1
CAPSULE, DELAYED RELEASE ORAL
Qty: 90 CAP | Refills: 3 | Status: SHIPPED | OUTPATIENT
Start: 2019-01-09 | End: 2020-01-29

## 2019-01-09 RX ORDER — LOSARTAN POTASSIUM 100 MG/1
TABLET ORAL
Refills: 3 | COMMUNITY
Start: 2018-12-05

## 2019-01-09 RX ORDER — ERGOCALCIFEROL 1.25 MG/1
50000 CAPSULE ORAL
Qty: 12 CAP | Refills: 3 | Status: SHIPPED | OUTPATIENT
Start: 2019-01-09 | End: 2020-05-11

## 2019-01-09 RX ORDER — ATORVASTATIN CALCIUM 10 MG/1
10 TABLET, FILM COATED ORAL EVERY OTHER DAY
Qty: 45 TAB | Refills: 3 | Status: SHIPPED | OUTPATIENT
Start: 2019-01-09 | End: 2020-03-08

## 2019-01-09 NOTE — PROGRESS NOTES
Reviewed record in preparation for visit and have obtained necessary documentation. Identified pt with two pt identifiers(name and ). Chief Complaint Patient presents with  Shortness of Breath  
  has being seeing Dr. Shirley Whitman (cardio)  Dizziness x2 weeks; notices more in am after getting up Health Maintenance Due Topic Date Due  
 DTaP/Tdap/Td series (1 - Tdap) 1947  Shingrix Vaccine Age 50> (1 of 2) 1976  GLAUCOMA SCREENING Q2Y  1991  Pneumococcal 65+ Low/Medium Risk (2 of 2 - PPSV23) 10/14/2017  Influenza Age 5 to Adult  2018  MEDICARE YEARLY EXAM  10/29/2018 Ms. Jada Simmons has a reminder for a \"due or due soon\" health maintenance. I have asked that she discuss health maintenance topic(s) due with Her  primary care provider. Coordination of Care Questionnaire: 
:  
 
1) Have you been to an emergency room, urgent care clinic since your last visit? no  
Hospitalized since your last visit? no          
 
2) Have you seen or consulted any other health care providers outside of [facility] since your last visit? yes  (Include any pap smears or colon screenings in this section.) 3) Do you have an Advance Directive on file? no 
 
4) Are you interested in receiving information on Advance Directives? NO Patient is accompanied by self I have received verbal consent from Tawana Jo to discuss any/all medical information while they are present in the room.

## 2019-01-09 NOTE — PROGRESS NOTES
Cheri rAanda is a 80 y.o. female who presents for evaluation of sob/glass. Last seen by me aug 2, 2017. Since then had PPM implanted by dr Lion Haider, and also sounds like she had talc pleurodesis done at [facility]. Comes in today with son, complaining of increased sob/glass for 'a while'. Denies any chest pain, f/c or other systemic complaints. ROS: 
Constitutional: negative for fevers, chills, anorexia and weight loss Eyes:   negative for visual disturbance and irritation ENT:   negative for tinnitus,sore throat,nasal congestion,ear pain,hoarseness Respiratory:  negative for cough, hemoptysis, dyspnea,wheezing CV:   negative for chest pain, palpitations, lower extremity edema GI:   negative for nausea, vomiting, diarrhea, abdominal pain,melena Genitourinary: negative for frequency, dysuria and hematuria Musculoskel: negative for myalgias, arthralgias, back pain, muscle weakness, joint pain Neurological:  negative for headaches, dizziness, focal weakness, numbness Psychiatric:     Negative for depression or anxiety Past Medical History:  
Diagnosis Date  Arthritis  GERD (gastroesophageal reflux disease)  Hypertension  Psychiatric disorder  Thyroid disease History reviewed. No pertinent surgical history. Family History Problem Relation Age of Onset  Hypertension Mother  Heart Disease Mother  Cancer Maternal Aunt   
     breast  
 
 
Social History Socioeconomic History  Marital status:  Spouse name: Not on file  Number of children: Not on file  Years of education: Not on file  Highest education level: Not on file Social Needs  Financial resource strain: Not on file  Food insecurity - worry: Not on file  Food insecurity - inability: Not on file  Transportation needs - medical: Not on file  Transportation needs - non-medical: Not on file Occupational History  Not on file Tobacco Use  
  Smoking status: Never Smoker  Smokeless tobacco: Never Used Substance and Sexual Activity  Alcohol use: Yes Alcohol/week: 0.6 oz Types: 1 Glasses of wine per week Comment: occasional  
 Drug use: Yes Types: Prescription  Sexual activity: Not Currently Other Topics Concern  Not on file Social History Narrative  Not on file Visit Vitals /72 (BP 1 Location: Left arm, BP Patient Position: Sitting) Pulse 87 Temp 97.6 °F (36.4 °C) (Oral) Resp 22 Ht 5' 5\" (1.651 m) Wt 171 lb (77.6 kg) SpO2 97% BMI 28.46 kg/m² Physical Examination:  
General - Well appearing female HEENT - PERRL, TM no erythema/opacification, normal nasal turbinates, no oropharyngeal erythema or exudate, MMM Neck - supple, no bruits, no thyroidomegaly, no lymphadenopathy Pulm - clear to auscultation bilaterally, decreased on right side Cardio - RRR, normal S1 S2, no murmur Abd - soft, nontender, no masses, no HSM Extrem -trace edema, +2 distal pulses Neuro-  No focal deficits, CN intact Assessment/Plan: 1.  Sob/glass--check cxr, check cbc, cmp, tsh 2. htn--controlled with cozaar, coreg, bumex 3.  ckd 3--check cmp 4. Hx pleural effusions--sounds like she had pleurodesis done 5. Anxiety and depression--continue lexapro, trazodone 6. Hypothyroid--check tsh, on synthroid 7. Routine adult health maintenance--flu shot and pneumovax 23 both given today. rtc 6 months, sooner if labs abn.  
 
 
 
Sanjay Garcia III, DO

## 2019-01-09 NOTE — PATIENT INSTRUCTIONS
DASH Diet: Care Instructions Your Care Instructions The DASH diet is an eating plan that can help lower your blood pressure. DASH stands for Dietary Approaches to Stop Hypertension. Hypertension is high blood pressure. The DASH diet focuses on eating foods that are high in calcium, potassium, and magnesium. These nutrients can lower blood pressure. The foods that are highest in these nutrients are fruits, vegetables, low-fat dairy products, nuts, seeds, and legumes. But taking calcium, potassium, and magnesium supplements instead of eating foods that are high in those nutrients does not have the same effect. The DASH diet also includes whole grains, fish, and poultry. The DASH diet is one of several lifestyle changes your doctor may recommend to lower your high blood pressure. Your doctor may also want you to decrease the amount of sodium in your diet. Lowering sodium while following the DASH diet can lower blood pressure even further than just the DASH diet alone. Follow-up care is a key part of your treatment and safety. Be sure to make and go to all appointments, and call your doctor if you are having problems. It's also a good idea to know your test results and keep a list of the medicines you take. How can you care for yourself at home? Following the DASH diet · Eat 4 to 5 servings of fruit each day. A serving is 1 medium-sized piece of fruit, ½ cup chopped or canned fruit, 1/4 cup dried fruit, or 4 ounces (½ cup) of fruit juice. Choose fruit more often than fruit juice. · Eat 4 to 5 servings of vegetables each day. A serving is 1 cup of lettuce or raw leafy vegetables, ½ cup of chopped or cooked vegetables, or 4 ounces (½ cup) of vegetable juice. Choose vegetables more often than vegetable juice. · Get 2 to 3 servings of low-fat and fat-free dairy each day. A serving is 8 ounces of milk, 1 cup of yogurt, or 1 ½ ounces of cheese. · Eat 6 to 8 servings of grains each day. A serving is 1 slice of bread, 1 ounce of dry cereal, or ½ cup of cooked rice, pasta, or cooked cereal. Try to choose whole-grain products as much as possible. · Limit lean meat, poultry, and fish to 2 servings each day. A serving is 3 ounces, about the size of a deck of cards. · Eat 4 to 5 servings of nuts, seeds, and legumes (cooked dried beans, lentils, and split peas) each week. A serving is 1/3 cup of nuts, 2 tablespoons of seeds, or ½ cup of cooked beans or peas. · Limit fats and oils to 2 to 3 servings each day. A serving is 1 teaspoon of vegetable oil or 2 tablespoons of salad dressing. · Limit sweets and added sugars to 5 servings or less a week. A serving is 1 tablespoon jelly or jam, ½ cup sorbet, or 1 cup of lemonade. · Eat less than 2,300 milligrams (mg) of sodium a day. If you limit your sodium to 1,500 mg a day, you can lower your blood pressure even more. Tips for success · Start small. Do not try to make dramatic changes to your diet all at once. You might feel that you are missing out on your favorite foods and then be more likely to not follow the plan. Make small changes, and stick with them. Once those changes become habit, add a few more changes. · Try some of the following: ? Make it a goal to eat a fruit or vegetable at every meal and at snacks. This will make it easy to get the recommended amount of fruits and vegetables each day. ? Try yogurt topped with fruit and nuts for a snack or healthy dessert. ? Add lettuce, tomato, cucumber, and onion to sandwiches. ? Combine a ready-made pizza crust with low-fat mozzarella cheese and lots of vegetable toppings. Try using tomatoes, squash, spinach, broccoli, carrots, cauliflower, and onions. ? Have a variety of cut-up vegetables with a low-fat dip as an appetizer instead of chips and dip. ? Sprinkle sunflower seeds or chopped almonds over salads.  Or try adding chopped walnuts or almonds to cooked vegetables. ? Try some vegetarian meals using beans and peas. Add garbanzo or kidney beans to salads. Make burritos and tacos with mashed lopez beans or black beans. Where can you learn more? Go to http://franco-olivier.info/. Enter Y062 in the search box to learn more about \"DASH Diet: Care Instructions. \" Current as of: December 6, 2017 Content Version: 11.8 © 1412-0112 [employer]. Care instructions adapted under license by [employer] (which disclaims liability or warranty for this information). If you have questions about a medical condition or this instruction, always ask your healthcare professional. [address] any warranty or liability for your use of this information.

## 2019-01-10 ENCOUNTER — TELEPHONE (OUTPATIENT)
Dept: INTERNAL MEDICINE CLINIC | Age: 84
End: 2019-01-10

## 2019-01-10 ENCOUNTER — HOSPITAL ENCOUNTER (OUTPATIENT)
Dept: GENERAL RADIOLOGY | Age: 84
Discharge: HOME | End: 2019-01-10
Attending: RADIOLOGY
Payer: MEDICARE

## 2019-01-10 ENCOUNTER — HOSPITAL ENCOUNTER (OUTPATIENT)
Dept: ULTRASOUND IMAGING | Age: 84
Discharge: HOME | End: 2019-01-10
Attending: INTERNAL MEDICINE
Payer: MEDICARE

## 2019-01-10 VITALS
HEART RATE: 77 BPM | DIASTOLIC BLOOD PRESSURE: 51 MMHG | OXYGEN SATURATION: 100 % | HEIGHT: 65 IN | SYSTOLIC BLOOD PRESSURE: 134 MMHG | RESPIRATION RATE: 18 BRPM | WEIGHT: 165 LBS | TEMPERATURE: 97.9 F | BODY MASS INDEX: 27.49 KG/M2

## 2019-01-10 DIAGNOSIS — J90 PLEURAL EFFUSION, RIGHT: ICD-10-CM

## 2019-01-10 LAB
ALBUMIN FLD-MCNC: 2.8 G/DL
ALBUMIN SERPL-MCNC: 4.5 G/DL (ref 3.2–4.6)
ALBUMIN/GLOB SERPL: 2.3 {RATIO} (ref 1.2–2.2)
ALP SERPL-CCNC: 88 IU/L (ref 39–117)
ALT SERPL-CCNC: 7 IU/L (ref 0–32)
AMYLASE FLD-CCNC: 52 U/L
APPEARANCE FLD: ABNORMAL
AST SERPL-CCNC: 13 IU/L (ref 0–40)
BASOPHILS # BLD AUTO: 0.1 X10E3/UL (ref 0–0.2)
BASOPHILS NFR BLD AUTO: 1 %
BILIRUB SERPL-MCNC: 1.1 MG/DL (ref 0–1.2)
BUN SERPL-MCNC: 19 MG/DL (ref 10–36)
BUN/CREAT SERPL: 18 (ref 12–28)
CALCIUM SERPL-MCNC: 9.6 MG/DL (ref 8.7–10.3)
CHLORIDE SERPL-SCNC: 107 MMOL/L (ref 96–106)
CO2 SERPL-SCNC: 23 MMOL/L (ref 20–29)
COLOR FLD: YELLOW
CREAT SERPL-MCNC: 1.08 MG/DL (ref 0.57–1)
EOSINOPHIL # BLD AUTO: 0.2 X10E3/UL (ref 0–0.4)
EOSINOPHIL NFR BLD AUTO: 3 %
ERYTHROCYTE [DISTWIDTH] IN BLOOD BY AUTOMATED COUNT: 14.8 % (ref 12.3–15.4)
GLOBULIN SER CALC-MCNC: 2 G/DL (ref 1.5–4.5)
GLUCOSE FLD-MCNC: 105 MG/DL
GLUCOSE SERPL-MCNC: 85 MG/DL (ref 65–99)
HCT VFR BLD AUTO: 41.2 % (ref 34–46.6)
HGB BLD-MCNC: 13.8 G/DL (ref 11.1–15.9)
IMM GRANULOCYTES # BLD AUTO: 0 X10E3/UL (ref 0–0.1)
IMM GRANULOCYTES NFR BLD AUTO: 0 %
LDH FLD L TO P-CCNC: 76 U/L
LYMPHOCYTES # BLD AUTO: 2 X10E3/UL (ref 0.7–3.1)
LYMPHOCYTES NFR BLD AUTO: 28 %
LYMPHOCYTES NFR FLD: 63 %
MCH RBC QN AUTO: 30.9 PG (ref 26.6–33)
MCHC RBC AUTO-ENTMCNC: 33.5 G/DL (ref 31.5–35.7)
MCV RBC AUTO: 92 FL (ref 79–97)
MESOTHL CELL NFR FLD: 4 %
MONOCYTES # BLD AUTO: 0.6 X10E3/UL (ref 0.1–0.9)
MONOCYTES NFR BLD AUTO: 8 %
MONOS+MACROS NFR FLD: 31 %
NEUTROPHILS # BLD AUTO: 4.5 X10E3/UL (ref 1.4–7)
NEUTROPHILS NFR BLD AUTO: 60 %
NEUTROPHILS NFR FLD: 2 %
NUC CELL # FLD: 1163 /CU MM
PLATELET # BLD AUTO: 256 X10E3/UL (ref 150–379)
POTASSIUM SERPL-SCNC: 4.2 MMOL/L (ref 3.5–5.2)
PROT FLD-MCNC: 3.8 G/DL
PROT SERPL-MCNC: 6.5 G/DL (ref 6–8.5)
RBC # BLD AUTO: 4.47 X10E6/UL (ref 3.77–5.28)
RBC # FLD: >100 /CU MM
SODIUM SERPL-SCNC: 145 MMOL/L (ref 134–144)
SPECIMEN SOURCE FLD: ABNORMAL
SPECIMEN SOURCE FLD: NORMAL
TSH SERPL DL<=0.005 MIU/L-ACNC: 1.75 UIU/ML (ref 0.45–4.5)
WBC # BLD AUTO: 7.3 X10E3/UL (ref 3.4–10.8)

## 2019-01-10 PROCEDURE — 87205 SMEAR GRAM STAIN: CPT

## 2019-01-10 PROCEDURE — 88305 TISSUE EXAM BY PATHOLOGIST: CPT

## 2019-01-10 PROCEDURE — 82945 GLUCOSE OTHER FLUID: CPT

## 2019-01-10 PROCEDURE — 83615 LACTATE (LD) (LDH) ENZYME: CPT

## 2019-01-10 PROCEDURE — 87075 CULTR BACTERIA EXCEPT BLOOD: CPT

## 2019-01-10 PROCEDURE — 32555 ASPIRATE PLEURA W/ IMAGING: CPT

## 2019-01-10 PROCEDURE — 89050 BODY FLUID CELL COUNT: CPT

## 2019-01-10 PROCEDURE — 82042 OTHER SOURCE ALBUMIN QUAN EA: CPT

## 2019-01-10 PROCEDURE — 82150 ASSAY OF AMYLASE: CPT

## 2019-01-10 PROCEDURE — 71045 X-RAY EXAM CHEST 1 VIEW: CPT

## 2019-01-10 PROCEDURE — 84157 ASSAY OF PROTEIN OTHER: CPT

## 2019-01-10 PROCEDURE — 74011250636 HC RX REV CODE- 250/636: Performed by: INTERNAL MEDICINE

## 2019-01-10 PROCEDURE — 88112 CYTOPATH CELL ENHANCE TECH: CPT

## 2019-01-10 RX ORDER — LIDOCAINE HYDROCHLORIDE 10 MG/ML
10 INJECTION, SOLUTION EPIDURAL; INFILTRATION; INTRACAUDAL; PERINEURAL
Status: COMPLETED | OUTPATIENT
Start: 2019-01-10 | End: 2019-01-10

## 2019-01-10 RX ADMIN — LIDOCAINE HYDROCHLORIDE 10 ML: 10 INJECTION, SOLUTION EPIDURAL; INFILTRATION; INTRACAUDAL; PERINEURAL at 15:00

## 2019-01-10 NOTE — ROUTINE PROCESS
1425  Patient arrived via wheelchair to radiology recovery, alert and oriented x 4.    1555  Dr. Betsy Uribe advised post thoracentesis portable xray did not show a pneumothorax and patient can be discharged. 1605  Patient alert and oriented x 4, denies difficulty breathing or chest pain, dressing to procedure site dry and intact. Patient provided with verbal and written discharge instructions, patient verbalized understanding. Patient discharged via wheelchair with son to transport home.

## 2019-01-10 NOTE — PROCEDURES
PROCEDURE:Right thoracentesis. INDICATION:SOB. ANESTHESIA:local.  COMPLICATION:NONE. SPECIMENS REMOVED:samples to lab;ongoing drainage. BLOOD LOSS:NONE. /ASSISTANT:GLADYS Adams RECOMMENDATIONS:none. CONSENT OBTAINED:YES.  NOTES:none.

## 2019-01-10 NOTE — TELEPHONE ENCOUNTER
Called, spoke to pt. Two identifiers confirmed. Notified pt of chest xray results per Dr. Lady Hansen. Called and spoke with Chrissy Lo MA of pt's XRAY results. Aaron stated per Dr. [place] pt needs to be seen in their office. Aaron stated she will call pt and get her in for an appointment today.

## 2019-01-10 NOTE — DISCHARGE INSTRUCTIONS
[address] [facility]  Radiology Department  [phone]    Radiologist:  Dr. Meme Reyes    Date:  01/10/2019      Thoracentesis Discharge Instructions    You may have an aching pain in the puncture site tonight as the numbing medicine wears off. You may take Tylenol, as directed on the label, for pain or discomfort. Avoid ibuprofen (Advil, Motrin) and aspirin products for the next 48 hours as these drugs may increase your chance of bleeding. You have develop a mild cough as the lungs re expand with air, this should resolve with in the next 24 hours. Resume your previous diet and continue your prescribed medicaitons. Rest for the next 24 hours. If you experience shortness of breath (other than your normal breathing pattern) difficulty breathing, or have severe chest pain, call 911 and go to the nearest Emergency Room.     Be sure to follow up with your referring physician as soon as possible

## 2019-01-14 LAB
BACTERIA SPEC CULT: NORMAL
GRAM STN SPEC: NORMAL
GRAM STN SPEC: NORMAL
SERVICE CMNT-IMP: NORMAL
SERVICE CMNT-IMP: NORMAL

## 2019-01-14 RX ORDER — ESCITALOPRAM OXALATE 10 MG/1
10 TABLET ORAL DAILY
Qty: 90 TAB | Refills: 3 | Status: SHIPPED | OUTPATIENT
Start: 2019-01-14 | End: 2020-01-19

## 2019-01-14 NOTE — TELEPHONE ENCOUNTER
PCP: Mitzi Trujillo DO    Last appt: 1/9/2019  Future Appointments   Date Time Provider Keaton Jen   7/9/2019 10:00 AM Tia Arroyo       Requested Prescriptions     Pending Prescriptions Disp Refills    escitalopram oxalate (LEXAPRO) 10 mg tablet 90 Tab 3     Sig: Take 1 Tab by mouth daily.

## 2019-04-18 RX ORDER — LANSOPRAZOLE 30 MG/1
CAPSULE, DELAYED RELEASE ORAL
Qty: 90 CAP | Refills: 3 | Status: CANCELLED | OUTPATIENT
Start: 2019-04-18

## 2019-04-18 NOTE — TELEPHONE ENCOUNTER
Called and spoke with pt's pharmacy. Pt's insurance will only allow a total of 90 pills within a calendar year. Pharmacy will be faxing over PA.

## 2019-04-18 NOTE — TELEPHONE ENCOUNTER
#[phone] pt states she is out of this medication and is having heartburn. [facility] is telling her she has already gotten too much. Please call the pharmacy as pt needs this before the wknd please.

## 2019-07-09 ENCOUNTER — OFFICE VISIT (OUTPATIENT)
Dept: INTERNAL MEDICINE CLINIC | Age: 84
End: 2019-07-09

## 2019-07-09 VITALS
RESPIRATION RATE: 18 BRPM | DIASTOLIC BLOOD PRESSURE: 76 MMHG | BODY MASS INDEX: 28.16 KG/M2 | SYSTOLIC BLOOD PRESSURE: 114 MMHG | OXYGEN SATURATION: 97 % | WEIGHT: 169 LBS | HEART RATE: 100 BPM | HEIGHT: 65 IN | TEMPERATURE: 97.6 F

## 2019-07-09 DIAGNOSIS — F41.9 ANXIETY AND DEPRESSION: ICD-10-CM

## 2019-07-09 DIAGNOSIS — N18.30 CKD (CHRONIC KIDNEY DISEASE) STAGE 3, GFR 30-59 ML/MIN (HCC): ICD-10-CM

## 2019-07-09 DIAGNOSIS — E78.2 MIXED HYPERLIPIDEMIA: ICD-10-CM

## 2019-07-09 DIAGNOSIS — I10 ESSENTIAL HYPERTENSION: Primary | ICD-10-CM

## 2019-07-09 DIAGNOSIS — F32.A ANXIETY AND DEPRESSION: ICD-10-CM

## 2019-07-09 DIAGNOSIS — J90 PLEURAL EFFUSION: ICD-10-CM

## 2019-07-09 DIAGNOSIS — E03.9 ACQUIRED HYPOTHYROIDISM: ICD-10-CM

## 2019-07-09 NOTE — PROGRESS NOTES
Reviewed record in preparation for visit and have obtained necessary documentation. Identified pt with two pt identifiers(name and ). Chief Complaint   Patient presents with    Hypertension     6 month follow up    Thyroid Problem       Health Maintenance Due   Topic Date Due    DTaP/Tdap/Td series (1 - Tdap) 1947    Shingrix Vaccine Age 50> (1 of 2) 1976    GLAUCOMA SCREENING Q2Y  1991       Ms. Yamil Parikh has a reminder for a \"due or due soon\" health maintenance. I have asked that she discuss health maintenance topic(s) due with Her  primary care provider. Coordination of Care Questionnaire:  :     1) Have you been to an emergency room, urgent care clinic since your last visit? no   Hospitalized since your last visit? no             2) Have you seen or consulted any other health care providers outside of [address] [place] [address] since your last visit? no  (Include any pap smears or colon screenings in this section.)    3) Do you have an Advance Directive on file? no    4) Are you interested in receiving information on Advance Directives? NO    Patient is accompanied by self I have received verbal consent from Yuko Tran to discuss any/all medical information while they are present in the room.

## 2019-07-09 NOTE — PATIENT INSTRUCTIONS
Office Policies Phone calls/patient messages: Please allow up to 24 hours for someone in the office to contact you about your call or message. Be mindful your provider may be out of the office or your message may require further review. We encourage you to use [employer] for your messages as this is a faster, more efficient way to communicate with our office Medication Refills: 
         
Prescription medications require 48-72 business hours to process. We encourage you to use [employer] for your refills. For controlled medications: Please allow 72 business hours to process. Certain medications may require you to  a written prescription at our office. NO narcotic/controlled medications will be prescribed after 4pm Monday through Friday or on weekends Form/Paperwork Completion: 
         
Please note a $25 fee may incur for all paperwork for completed by our providers. We ask that you allow 7-10 business days. Pre-payment is due prior to picking up/faxing the completed form. You may also download your forms to [employer] to have your doctor print off. Walking for Exercise: Care Instructions Your Care Instructions Walking is one of the easiest ways to get the exercise you need for good health. A brisk, 30-minute walk each day can help you feel better and have more energy. It can help you lower your risk of disease. Walking can help you keep your bones strong and your heart healthy. Check with your doctor before you start a walking plan if you have heart problems, other health issues, or you have not been active in a long time. Follow your doctor's instructions for safe levels of exercise. Follow-up care is a key part of your treatment and safety. Be sure to make and go to all appointments, and call your doctor if you are having problems. It's also a good idea to know your test results and keep a list of the medicines you take. How can you care for yourself at home? Getting started · Start slowly and set a short-term goal. For example, walk for 5 or 10 minutes every day. · Bit by bit, increase the amount you walk every day. Try for at least 30 minutes on most days of the week. You also may want to swim, bike, or do other activities. · If finding enough time is a problem, it is fine to be active in blocks of 10 minutes or more throughout your day and week. · To get the heart-healthy benefits of walking, you need to walk briskly enough to increase your heart rate and breathing, but not so fast that you cannot talk comfortably. · Wear comfortable shoes that fit well and provide good support for your feet and ankles. Staying with your plan · After you've made walking a habit, set a longer-term goal. You may want to set a goal of walking briskly for longer or walking farther. Experts say to do 2½ hours of moderate activity a week. A faster heartbeat is what defines moderate-level activity. · To stay motivated, walk with friends, coworkers, or pets. · Use a phone martha or pedometer to track your steps each day. Set a goal to increase your steps. Once you get there, set a higher goal. Aim for 10,000 steps a day. · If the weather keeps you from walking outside, go for walks at the mall with a friend. Local schools and churches may have indoor gyms where you can walk. Fitting a walk into your workday · Park several blocks away from work, or get off the bus a few stops early. · Use the stairs instead of the elevator, at least for a few floors. · Suggest holding meetings with colleagues during a walk inside or outside the building. · Use the restroom that is the farthest from your desk or workstation. · Use your morning and afternoon breaks to take quick 15-minute walks. Staying safe · Know your surroundings. Walk in a well-lighted, safe place. If it is dark, walk with a partner. Wear light-colored clothing.  If you can, buy a vest or jacket that reflects light. · Carry a cell phone for emergencies. · Drink plenty of water. Take a water bottle with you when you walk. This is very important if it is hot out. · Be careful not to slip on wet or icy ground. You can buy \"grippers\" for your shoes to help keep you from slipping. · Pay attention to your walking surface. Use sidewalks and paths. · If you have breathing problems like asthma or COPD, ask your doctor when it is safe for you to walk outdoors. Cold, dry air, smog, pollen, or other things in the air could cause breathing problems. Where can you learn more? Go to http://franco-olivier.info/. Enter R159 in the search box to learn more about \"Walking for Exercise: Care Instructions. \" Current as of: August 19, 2018 Content Version: 11.9 © 2628-7358 [employer]. Care instructions adapted under license by [employer] (which disclaims liability or warranty for this information). If you have questions about a medical condition or this instruction, always ask your healthcare professional. [place] [address] any warranty or liability for your use of this information. Try to walk for 15-20 minutes every day. You can break it up into 2 or 3 times, 5 minutes each session.

## 2019-07-09 NOTE — PROGRESS NOTES
Mauro Barry is a 80 y.o. female who presents for evaluation of routine follow up. Last seen by me jan 9, 2019 for sob/glass. cxr showed re-accumulation of right sided pleural effusion, which she had tapped on homero 10. All cx and cytology was negative. Has done well since then, though she did have a ppm implanted. ROS:  Constitutional: negative for fevers, chills, anorexia and weight loss  Eyes:   negative for visual disturbance and irritation  ENT:   negative for tinnitus,sore throat,nasal congestion,ear pain,hoarseness  Respiratory:  negative for cough, hemoptysis, dyspnea,wheezing  CV:   negative for chest pain, palpitations, lower extremity edema  GI:   negative for nausea, vomiting, diarrhea, abdominal pain,melena  Genitourinary: negative for frequency, dysuria and hematuria  Musculoskel: negative for myalgias, arthralgias, back pain, muscle weakness, joint pain  Neurological:  negative for headaches, dizziness, focal weakness, numbness  Psychiatric:     Negative for depression or anxiety      Past Medical History:   Diagnosis Date    Arthritis     GERD (gastroesophageal reflux disease)     Hypertension     Psychiatric disorder     Thyroid disease        History reviewed. No pertinent surgical history.     Family History   Problem Relation Age of Onset    Hypertension Mother     Heart Disease Mother     Cancer Maternal Aunt         breast       Social History     Socioeconomic History    Marital status:      Spouse name: Not on file    Number of children: Not on file    Years of education: Not on file    Highest education level: Not on file   Occupational History    Not on file   Social Needs    Financial resource strain: Not on file    Food insecurity:     Worry: Not on file     Inability: Not on file    Transportation needs:     Medical: Not on file     Non-medical: Not on file   Tobacco Use    Smoking status: Never Smoker    Smokeless tobacco: Never Used   Substance and Sexual Activity    Alcohol use: Yes     Alcohol/week: 0.6 oz     Types: 1 Glasses of wine per week     Comment: occasional    Drug use: Yes     Types: Prescription    Sexual activity: Not Currently   Lifestyle    Physical activity:     Days per week: Not on file     Minutes per session: Not on file    Stress: Not on file   Relationships    Social connections:     Talks on phone: Not on file     Gets together: Not on file     Attends Orthodox service: Not on file     Active member of club or organization: Not on file     Attends meetings of clubs or organizations: Not on file     Relationship status: Not on file    Intimate partner violence:     Fear of current or ex partner: Not on file     Emotionally abused: Not on file     Physically abused: Not on file     Forced sexual activity: Not on file   Other Topics Concern    Not on file   Social History Narrative    Not on file            Visit Vitals  /76 (BP 1 Location: Right arm, BP Patient Position: Sitting)   Pulse 100   Temp 97.6 °F (36.4 °C) (Oral)   Resp 18   Ht 5' 5\" (1.651 m)   Wt 169 lb (76.7 kg)   SpO2 97%   BMI 28.12 kg/m²       Physical Examination:   General - Well appearing female  HEENT - PERRL, TM no erythema/opacification, normal nasal turbinates, no oropharyngeal erythema or exudate, MMM  Neck - supple, no bruits, no thyroidomegaly, no lymphadenopathy  Pulm - clear to auscultation bilaterally  Cardio - RRR, normal S1 S2, no murmur  Abd - soft, nontender, no masses, no HSM  Extrem - no edema, +2 distal pulses  Neuro-  No focal deficits, CN intact     Assessment/Plan:    1.  htn--controlled with coreg  2.  hyperlipids--on lipitor  3. Hypothyroid--on synthroid  4. Anxiety and depression--doing well with lexapro and trazodone  5.  ckd 3--has been stable  6. Recurrent right sided pleural effusion--has been drained twice, most recently jan 2019. Asymptomatic at this time.     rtc 6 months        Jazmine Jones III, DO

## 2019-09-03 ENCOUNTER — HOSPITAL ENCOUNTER (OUTPATIENT)
Dept: GENERAL RADIOLOGY | Age: 84
Discharge: HOME | End: 2019-09-03
Payer: MEDICARE

## 2019-09-03 DIAGNOSIS — J18.9 PLEURAL EFFUSION ASSOCIATED WITH PULMONARY INFECTION: ICD-10-CM

## 2019-09-03 DIAGNOSIS — J91.8 PLEURAL EFFUSION ASSOCIATED WITH PULMONARY INFECTION: ICD-10-CM

## 2019-09-03 PROCEDURE — 71046 X-RAY EXAM CHEST 2 VIEWS: CPT

## 2019-09-10 ENCOUNTER — HOSPITAL ENCOUNTER (OUTPATIENT)
Dept: ULTRASOUND IMAGING | Age: 84
Discharge: HOME | End: 2019-09-10
Attending: NURSE PRACTITIONER
Payer: COMMERCIAL

## 2019-09-10 ENCOUNTER — HOSPITAL ENCOUNTER (OUTPATIENT)
Dept: GENERAL RADIOLOGY | Age: 84
Discharge: HOME | End: 2019-09-10
Attending: RADIOLOGY
Payer: COMMERCIAL

## 2019-09-10 VITALS
HEART RATE: 107 BPM | WEIGHT: 162 LBS | OXYGEN SATURATION: 98 % | DIASTOLIC BLOOD PRESSURE: 60 MMHG | RESPIRATION RATE: 12 BRPM | HEIGHT: 66 IN | BODY MASS INDEX: 26.03 KG/M2 | SYSTOLIC BLOOD PRESSURE: 104 MMHG

## 2019-09-10 DIAGNOSIS — J91.8 PLEURAL EFFUSION IN OTHER CONDITIONS CLASSIFIED ELSEWHERE: ICD-10-CM

## 2019-09-10 LAB
APPEARANCE FLD: CLEAR
COLOR FLD: YELLOW
COMMENT, HOLDF: NORMAL
GLUCOSE FLD-MCNC: 118 MG/DL
LDH FLD L TO P-CCNC: 53 U/L
LYMPHOCYTES NFR FLD: 63 %
MESOTHL CELL NFR FLD: 1 %
MONOS+MACROS NFR FLD: 35 %
NEUTROPHILS NFR FLD: 1 %
NUC CELL # FLD: 742 /CU MM
PROT FLD-MCNC: 2.6 G/DL
RBC # FLD: >100 /CU MM
SAMPLES BEING HELD,HOLD: NORMAL
SPECIMEN SOURCE FLD: ABNORMAL
SPECIMEN SOURCE FLD: NORMAL

## 2019-09-10 PROCEDURE — 87205 SMEAR GRAM STAIN: CPT

## 2019-09-10 PROCEDURE — 87102 FUNGUS ISOLATION CULTURE: CPT

## 2019-09-10 PROCEDURE — 88112 CYTOPATH CELL ENHANCE TECH: CPT

## 2019-09-10 PROCEDURE — 88305 TISSUE EXAM BY PATHOLOGIST: CPT

## 2019-09-10 PROCEDURE — 83615 LACTATE (LD) (LDH) ENZYME: CPT

## 2019-09-10 PROCEDURE — 71045 X-RAY EXAM CHEST 1 VIEW: CPT

## 2019-09-10 PROCEDURE — 84157 ASSAY OF PROTEIN OTHER: CPT

## 2019-09-10 PROCEDURE — 82945 GLUCOSE OTHER FLUID: CPT

## 2019-09-10 PROCEDURE — 89050 BODY FLUID CELL COUNT: CPT

## 2019-09-10 PROCEDURE — 32555 ASPIRATE PLEURA W/ IMAGING: CPT

## 2019-09-10 NOTE — ROUTINE PROCESS
1340 - Arrived via w/c into the xray recovery area. A/O x 3 c/o sob. [address] Tino Smalls at bedside. + informed consent obtained.

## 2019-09-10 NOTE — DISCHARGE INSTRUCTIONS
[facility]  Radiology Department  [phone]    Radiologist: Dr Ryley Durham    Date: 09/10/2019      Thoracentesis Discharge Instructions    You may have an aching pain in the puncture site tonight as the numbing medicine wears off. You may take Tylenol, as directed on the label, for pain or discomfort. Avoid ibuprofen (Advil, Motrin) and aspirin products for the next 48 hours as these drugs may increase your chance of bleeding. You have develop a mild cough as the lungs re expand with air, this should resolve with in the next 24 hours. Resume your previous diet and continue your prescribed medicaitons. Rest for the next 24 hours. If you experience shortness of breath (other than your normal breathing pattern) difficulty breathing, or have severe chest pain, call 911 and go to the nearest Emergency Room.     Be sure to follow up with your referring physician as soon as possible

## 2019-09-14 LAB
BACTERIA SPEC CULT: NORMAL
BACTERIA SPEC CULT: NORMAL
GRAM STN SPEC: NORMAL
GRAM STN SPEC: NORMAL
SERVICE CMNT-IMP: NORMAL

## 2019-10-14 LAB
BACTERIA SPEC CULT: NORMAL
SERVICE CMNT-IMP: NORMAL

## 2020-01-19 RX ORDER — ESCITALOPRAM OXALATE 10 MG/1
TABLET ORAL
Qty: 90 TAB | Refills: 3 | Status: SHIPPED | OUTPATIENT
Start: 2020-01-19 | End: 2021-01-17

## 2020-01-29 RX ORDER — LANSOPRAZOLE 30 MG/1
CAPSULE, DELAYED RELEASE ORAL
Qty: 90 CAP | Refills: 3 | Status: SHIPPED | OUTPATIENT
Start: 2020-01-29

## 2020-02-07 ENCOUNTER — TELEPHONE (OUTPATIENT)
Dept: INTERNAL MEDICINE CLINIC | Age: 85
End: 2020-02-07

## 2020-02-07 NOTE — TELEPHONE ENCOUNTER
#[phone] pt Is having a foot problem (suggested getting with pcp)  Pt needs to be seen much sooner than 3-17-20 which is first available for psr to schedule.

## 2020-02-10 NOTE — TELEPHONE ENCOUNTER
Called, spoke to pt. Two identifiers confirmed. Appointment scheduled for 2/13 @ 11 with Dr. Cristi Peters.   Pt verbalized understanding of information discussed w/ no further questions at this time.

## 2020-02-13 ENCOUNTER — OFFICE VISIT (OUTPATIENT)
Dept: INTERNAL MEDICINE CLINIC | Age: 85
End: 2020-02-13

## 2020-02-13 VITALS
RESPIRATION RATE: 14 BRPM | TEMPERATURE: 97.6 F | OXYGEN SATURATION: 98 % | WEIGHT: 153 LBS | HEART RATE: 67 BPM | SYSTOLIC BLOOD PRESSURE: 112 MMHG | DIASTOLIC BLOOD PRESSURE: 64 MMHG | HEIGHT: 66 IN | BODY MASS INDEX: 24.59 KG/M2

## 2020-02-13 DIAGNOSIS — I48.0 PAROXYSMAL ATRIAL FIBRILLATION (HCC): ICD-10-CM

## 2020-02-13 DIAGNOSIS — F34.1 DYSTHYMIA: ICD-10-CM

## 2020-02-13 DIAGNOSIS — M10.9 ACUTE GOUT OF RIGHT FOOT, UNSPECIFIED CAUSE: Primary | ICD-10-CM

## 2020-02-13 DIAGNOSIS — R73.9 HYPERGLYCEMIA: ICD-10-CM

## 2020-02-13 DIAGNOSIS — N18.30 CKD (CHRONIC KIDNEY DISEASE) STAGE 3, GFR 30-59 ML/MIN (HCC): ICD-10-CM

## 2020-02-13 DIAGNOSIS — E78.2 MIXED HYPERLIPIDEMIA: ICD-10-CM

## 2020-02-13 DIAGNOSIS — I10 ESSENTIAL HYPERTENSION: ICD-10-CM

## 2020-02-13 RX ORDER — PREDNISONE 20 MG/1
TABLET ORAL
Qty: 18 TAB | Refills: 0 | Status: SHIPPED | OUTPATIENT
Start: 2020-02-13 | End: 2020-05-11 | Stop reason: ALTCHOICE

## 2020-02-13 RX ORDER — COLCHICINE 0.6 MG/1
0.6 TABLET ORAL
Qty: 30 TAB | Refills: 0 | Status: SHIPPED | OUTPATIENT
Start: 2020-02-13 | End: 2020-02-24

## 2020-02-13 NOTE — PROGRESS NOTES
Amparo Lee is a 80 y.o. female who presents for evaluation of gout flare. Last seen by me July 9, 2019. Since then was started on xarelto a few months ago by dr Aroldo Reese for pafib. Over past 6 weeks, has had 2 bouts of gout in her right foot, saw dr Margarette Solares with podiatry both times, and got better after prednisone, but flared up again shortly thereafter. Having another bout that is just starting. Finished prednisone most recently about 2 weeks ago. Has also lost 16 lbs since last visit. She was sick with uri for 3-4 weeks earlier this year, and lost her appetite then, and it still has not returned. Denies any dysphagia or change in bowels. Son garcía is with her today. ROS:  Constitutional: negative for fevers, chills, anorexia and weight loss  Eyes:   negative for visual disturbance and irritation  ENT:   negative for tinnitus,sore throat,nasal congestion,ear pain,hoarseness  Respiratory:  negative for cough, hemoptysis, dyspnea,wheezing  CV:   negative for chest pain, palpitations, lower extremity edema  GI:   negative for nausea, vomiting, diarrhea, abdominal pain,melena  Genitourinary: negative for frequency, dysuria and hematuria  Musculoskel: negative for myalgias, arthralgias, back pain, muscle weakness, joint pain  Neurological:  negative for headaches, dizziness, focal weakness, numbness  Psychiatric:     Negative for depression or anxiety      Past Medical History:   Diagnosis Date    Arthritis     GERD (gastroesophageal reflux disease)     Hypertension     Psychiatric disorder     Thyroid disease        History reviewed. No pertinent surgical history.     Family History   Problem Relation Age of Onset    Hypertension Mother     Heart Disease Mother     Cancer Maternal Aunt         breast       Social History     Socioeconomic History    Marital status:      Spouse name: Not on file    Number of children: Not on file    Years of education: Not on file    Highest education level: Not on file   Occupational History    Not on file   Social Needs    Financial resource strain: Not on file    Food insecurity:     Worry: Not on file     Inability: Not on file    Transportation needs:     Medical: Not on file     Non-medical: Not on file   Tobacco Use    Smoking status: Never Smoker    Smokeless tobacco: Never Used   Substance and Sexual Activity    Alcohol use: Yes     Alcohol/week: 1.0 standard drinks     Types: 1 Glasses of wine per week     Comment: occasional    Drug use: Yes     Types: Prescription    Sexual activity: Not Currently   Lifestyle    Physical activity:     Days per week: Not on file     Minutes per session: Not on file    Stress: Not on file   Relationships    Social connections:     Talks on phone: Not on file     Gets together: Not on file     Attends Yarsani service: Not on file     Active member of club or organization: Not on file     Attends meetings of clubs or organizations: Not on file     Relationship status: Not on file    Intimate partner violence:     Fear of current or ex partner: Not on file     Emotionally abused: Not on file     Physically abused: Not on file     Forced sexual activity: Not on file   Other Topics Concern    Not on file   Social History Narrative    Not on file            Visit Vitals  /64 (BP 1 Location: Left arm, BP Patient Position: Sitting)   Pulse 67   Temp 97.6 °F (36.4 °C) (Oral)   Resp 14   Ht 5' 6\" (1.676 m)   Wt 153 lb (69.4 kg)   SpO2 98%   BMI 24.69 kg/m²       Physical Examination:   General - Well appearing female  HEENT - PERRL, TM no erythema/opacification, normal nasal turbinates, no oropharyngeal erythema or exudate, MMM  Neck - supple, no bruits, no thyroidomegaly, no lymphadenopathy  Pulm - clear to auscultation bilaterally  Cardio - RRR, normal S1 S2, no murmur  Abd - soft, nontender, no masses, no HSM  Extrem - no edema, +2 distal pulses.    ++some swelling in right ankle  Neuro- No focal deficits, CN intact     Assessment/Plan:    1. Right ankle swelling, probable gout--rx for prednisone to start today, and colchicine to have for any future needs. Check uric acid level also  2. pafib--in nsr today, on coreg and xarelto. Also has aicd   3.  htn--controlled with coreg, cozaar  4.  ckd 3--stable. Check cmp  5. Hypothyroid--on synthroid, check tsh  6.  hyperlipids--on lipitor, check flp  7. Recurrent right sided pleural effusion--exam normal today, denies sob/glass  8. Weight loss--monitor for now. No red flags expressed    rtc 6 months.   Send lab results to dr Caitlin Johnson

## 2020-02-13 NOTE — PROGRESS NOTES
Reviewed record in preparation for visit and have obtained necessary documentation. Identified pt with two pt identifiers(name and ). Chief Complaint   Patient presents with    Hypertension    Foot Swelling     right       Health Maintenance Due   Topic Date Due    DTaP/Tdap/Td series (1 - Tdap) 1937    Shingrix Vaccine Age 50> (1 of 2) 1976    Lipid Screen  10/13/2018       Ms. Temo Dumont has a reminder for a \"due or due soon\" health maintenance. I have asked that she discuss health maintenance topic(s) due with Her  primary care provider. Coordination of Care Questionnaire:  :     1) Have you been to an emergency room, urgent care clinic since your last visit? no   Hospitalized since your last visit? no             2) Have you seen or consulted any other health care providers outside of [address] [place] [address] since your last visit? no  (Include any pap smears or colon screenings in this section.)    3) Do you have an Advance Directive on file? no    4) Are you interested in receiving information on Advance Directives? NO    Patient is accompanied by self I have received verbal consent from Lola Torres to discuss any/all medical information while they are present in the room.

## 2020-02-13 NOTE — PATIENT INSTRUCTIONS
Office Policies Phone calls/patient messages: Please allow up to 24 hours for someone in the office to contact you about your call or message. Be mindful your provider may be out of the office or your message may require further review. We encourage you to use Retty for your messages as this is a faster, more efficient way to communicate with our office Medication Refills: 
         
Prescription medications require 48-72 business hours to process. We encourage you to use Retty for your refills. For controlled medications: Please allow 72 business hours to process. Certain medications may require you to  a written prescription at our office. NO narcotic/controlled medications will be prescribed after 4pm Monday through Friday or on weekends Form/Paperwork Completion: 
         
Please note a $25 fee may incur for all paperwork for completed by our providers. We ask that you allow 7-10 business days. Pre-payment is due prior to picking up/faxing the completed form. You may also download your forms to Retty to have your doctor print off. Gout: Care Instructions Your Care Instructions Gout is a form of arthritis caused by a buildup of uric acid crystals in a joint. It causes sudden attacks of pain, swelling, redness, and stiffness, usually in one joint, especially the big toe. Gout usually comes on without a cause. But it can be brought on by drinking alcohol (especially beer) or eating seafood and red meat. Taking certain medicines, such as diuretics or aspirin, also can bring on an attack of gout. Taking your medicines as prescribed and following up with your doctor regularly can help you avoid gout attacks in the future. Follow-up care is a key part of your treatment and safety.  Be sure to make and go to all appointments, and call your doctor if you are having problems. It's also a good idea to know your test results and keep a list of the medicines you take. How can you care for yourself at home? · If the joint is swollen, put ice or a cold pack on the area for 10 to 20 minutes at a time. Put a thin cloth between the ice and your skin. · Prop up the sore limb on a pillow when you ice it or anytime you sit or lie down during the next 3 days. Try to keep it above the level of your heart. This will help reduce swelling. · Rest sore joints. Avoid activities that put weight or strain on the joints for a few days. Take short rest breaks from your regular activities during the day. · Take your medicines exactly as prescribed. Call your doctor if you think you are having a problem with your medicine. · Take pain medicines exactly as directed. ? If the doctor gave you a prescription medicine for pain, take it as prescribed. ? If you are not taking a prescription pain medicine, ask your doctor if you can take an over-the-counter medicine. · Eat less seafood and red meat. · Check with your doctor before drinking alcohol. · Losing weight, if you are overweight, may help reduce attacks of gout. But do not go on a [employer]. \" Losing a lot of weight in a short amount of time can cause a gout attack. When should you call for help? Call your doctor now or seek immediate medical care if: 
  · You have a fever.  
  · The joint is so painful you cannot use it.  
  · You have sudden, unexplained swelling, redness, warmth, or severe pain in one or more joints.  
 Watch closely for changes in your health, and be sure to contact your doctor if: 
  · You have joint pain.  
  · Your symptoms get worse or are not improving after 2 or 3 days. Where can you learn more? Go to http://franco-olivier.info/. Enter S862 in the search box to learn more about \"Gout: Care Instructions. \" Current as of: April 1, 2019 Content Version: 12.2 © 2950-7073 Healthwise, [employer]. Care instructions adapted under license by [employer] (which disclaims liability or warranty for this information). If you have questions about a medical condition or this instruction, always ask your healthcare professional. [address] any warranty or liability for your use of this information. Purine-Restricted Diet: Care Instructions Your Care Instructions Purines are substances that are found in some foods. Your body turns purines into uric acid. High levels of uric acid can cause gout, which is a form of arthritis that causes pain and inflammation in joints. You may be able to help control the amount of uric acid in your body by limiting high-purine foods in your diet. Follow-up care is a key part of your treatment and safety. Be sure to make and go to all appointments, and call your doctor if you are having problems. It's also a good idea to know your test results and keep a list of the medicines you take. How can you care for yourself at home? · Plan your meals and snacks around foods that are low in purines and are safe for you to eat. These foods include: ? Green vegetables and tomatoes. ? Fruits. ? Whole-grain breads, rice, and cereals. ? Eggs, peanut butter, and nuts. ? Low-fat milk, cheese, and other milk products. ? Popcorn. ? Gelatin desserts, chocolate, cocoa, and cakes and sweets, in small amounts. · You can eat certain foods that are medium-high in purines, but eat them only once in a while. These foods include: 
? Legumes, such as dried beans and dried peas. You can have 1 cup cooked legumes each day. ? Asparagus, cauliflower, spinach, mushrooms, and green peas. ? Fish and seafood (other than very high-purine seafood). ? Oatmeal, wheat bran, and wheat germ. · Limit very high-purine foods, including: 
? Organ meats, such as liver, kidneys, sweetbreads, and brains. ? Meats, including schneider, beef, pork, and lamb. ? Game meats and any other meats in large amounts. ? Anchovies, sardines, herring, mackerel, and scallops. ? Gravy. ? Beer. Where can you learn more? Go to http://franco-olivier.info/. Enter F448 in the search box to learn more about \"Purine-Restricted Diet: Care Instructions. \" Current as of: November 7, 2018 Content Version: 12.2 © 6960-9566 [employer]. Care instructions adapted under license by [employer] (which disclaims liability or warranty for this information). If you have questions about a medical condition or this instruction, always ask your healthcare professional. [address] any warranty or liability for your use of this information.

## 2020-02-14 LAB
ALBUMIN SERPL-MCNC: 4.1 G/DL (ref 3.5–4.6)
ALBUMIN/GLOB SERPL: 2.2 {RATIO} (ref 1.2–2.2)
ALP SERPL-CCNC: 75 IU/L (ref 39–117)
ALT SERPL-CCNC: 7 IU/L (ref 0–32)
AST SERPL-CCNC: 13 IU/L (ref 0–40)
BASOPHILS # BLD AUTO: 0 X10E3/UL (ref 0–0.2)
BASOPHILS NFR BLD AUTO: 1 %
BILIRUB SERPL-MCNC: 0.6 MG/DL (ref 0–1.2)
BUN SERPL-MCNC: 20 MG/DL (ref 10–36)
BUN/CREAT SERPL: 19 (ref 12–28)
CALCIUM SERPL-MCNC: 9.1 MG/DL (ref 8.7–10.3)
CHLORIDE SERPL-SCNC: 107 MMOL/L (ref 96–106)
CHOLEST SERPL-MCNC: 136 MG/DL (ref 100–199)
CO2 SERPL-SCNC: 23 MMOL/L (ref 20–29)
CREAT SERPL-MCNC: 1.08 MG/DL (ref 0.57–1)
EOSINOPHIL # BLD AUTO: 0.1 X10E3/UL (ref 0–0.4)
EOSINOPHIL NFR BLD AUTO: 2 %
ERYTHROCYTE [DISTWIDTH] IN BLOOD BY AUTOMATED COUNT: 15.2 % (ref 11.7–15.4)
EST. AVERAGE GLUCOSE BLD GHB EST-MCNC: 111 MG/DL
GLOBULIN SER CALC-MCNC: 1.9 G/DL (ref 1.5–4.5)
GLUCOSE SERPL-MCNC: 77 MG/DL (ref 65–99)
HBA1C MFR BLD: 5.5 % (ref 4.8–5.6)
HCT VFR BLD AUTO: 37.3 % (ref 34–46.6)
HDLC SERPL-MCNC: 44 MG/DL
HGB BLD-MCNC: 12.2 G/DL (ref 11.1–15.9)
IMM GRANULOCYTES # BLD AUTO: 0 X10E3/UL (ref 0–0.1)
IMM GRANULOCYTES NFR BLD AUTO: 0 %
LDLC SERPL CALC-MCNC: 64 MG/DL (ref 0–99)
LYMPHOCYTES # BLD AUTO: 1.7 X10E3/UL (ref 0.7–3.1)
LYMPHOCYTES NFR BLD AUTO: 23 %
MCH RBC QN AUTO: 31.4 PG (ref 26.6–33)
MCHC RBC AUTO-ENTMCNC: 32.7 G/DL (ref 31.5–35.7)
MCV RBC AUTO: 96 FL (ref 79–97)
MONOCYTES # BLD AUTO: 0.5 X10E3/UL (ref 0.1–0.9)
MONOCYTES NFR BLD AUTO: 7 %
NEUTROPHILS # BLD AUTO: 5.1 X10E3/UL (ref 1.4–7)
NEUTROPHILS NFR BLD AUTO: 67 %
PLATELET # BLD AUTO: 235 X10E3/UL (ref 150–450)
POTASSIUM SERPL-SCNC: 4.1 MMOL/L (ref 3.5–5.2)
PROT SERPL-MCNC: 6 G/DL (ref 6–8.5)
RBC # BLD AUTO: 3.89 X10E6/UL (ref 3.77–5.28)
SODIUM SERPL-SCNC: 147 MMOL/L (ref 134–144)
TRIGL SERPL-MCNC: 142 MG/DL (ref 0–149)
TSH SERPL DL<=0.005 MIU/L-ACNC: 2.04 UIU/ML (ref 0.45–4.5)
URATE SERPL-MCNC: 7 MG/DL (ref 2.5–7.1)
VLDLC SERPL CALC-MCNC: 28 MG/DL (ref 5–40)
WBC # BLD AUTO: 7.5 X10E3/UL (ref 3.4–10.8)

## 2020-02-14 RX ORDER — ALLOPURINOL 300 MG/1
300 TABLET ORAL DAILY
Qty: 90 TAB | Refills: 3 | Status: SHIPPED | OUTPATIENT
Start: 2020-02-14 | End: 2020-05-11 | Stop reason: SINTOL

## 2020-02-14 NOTE — PROGRESS NOTES
Overall labs look good. Uric acid level is bit elevated though, so would recommend starting allopurinol in about 2 weeks. rx sent in.

## 2020-02-17 NOTE — PROGRESS NOTES
Called, spoke to pt. Two identifiers confirmed. Pt notified of results/recommendations per Dr. Thu Tatum. Pt verbalized understanding of information discussed w/ no further questions at this time.

## 2020-02-25 ENCOUNTER — TELEPHONE (OUTPATIENT)
Dept: INTERNAL MEDICINE CLINIC | Age: 85
End: 2020-02-25

## 2020-02-25 NOTE — TELEPHONE ENCOUNTER
Called, spoke to pt. Two identifiers confirmed. Notified pt to start allopurinol 2/28. Pt verbalized understanding of information discussed w/ no further questions at this time.

## 2020-02-25 NOTE — TELEPHONE ENCOUNTER
Patient states she needs a call back to be advised when she is to start her AllopurinoL (ZYLOPRIM) 300 mg tablet medication. Please call to advise.  Thank you

## 2020-03-08 RX ORDER — ATORVASTATIN CALCIUM 10 MG/1
TABLET, FILM COATED ORAL
Qty: 45 TAB | Refills: 3 | Status: SHIPPED | OUTPATIENT
Start: 2020-03-08

## 2020-03-16 RX ORDER — COLCHICINE 0.6 MG/1
TABLET ORAL
Qty: 30 TAB | Refills: 2 | Status: SHIPPED | OUTPATIENT
Start: 2020-03-16 | End: 2020-03-31

## 2020-05-11 ENCOUNTER — VIRTUAL VISIT (OUTPATIENT)
Dept: INTERNAL MEDICINE CLINIC | Age: 85
End: 2020-05-11

## 2020-05-11 VITALS — TEMPERATURE: 99 F

## 2020-05-11 DIAGNOSIS — T50.905A ADVERSE EFFECT OF DRUG, INITIAL ENCOUNTER: ICD-10-CM

## 2020-05-11 DIAGNOSIS — N18.30 CKD (CHRONIC KIDNEY DISEASE) STAGE 3, GFR 30-59 ML/MIN (HCC): ICD-10-CM

## 2020-05-11 DIAGNOSIS — R21 RASH AND NONSPECIFIC SKIN ERUPTION: Primary | ICD-10-CM

## 2020-05-11 DIAGNOSIS — M1A.9XX0 CHRONIC GOUT WITHOUT TOPHUS, UNSPECIFIED CAUSE, UNSPECIFIED SITE: ICD-10-CM

## 2020-05-11 RX ORDER — PREDNISONE 20 MG/1
TABLET ORAL
Qty: 21 TAB | Refills: 0 | Status: SHIPPED | OUTPATIENT
Start: 2020-05-11 | End: 2020-05-26 | Stop reason: SDUPTHER

## 2020-05-11 RX ORDER — TORSEMIDE 20 MG/1
TABLET ORAL
COMMUNITY
Start: 2020-04-14

## 2020-05-11 RX ORDER — AMIODARONE HYDROCHLORIDE 200 MG/1
TABLET ORAL
COMMUNITY
Start: 2020-02-22

## 2020-05-11 NOTE — PROGRESS NOTES
Carson Causey is a 80 y.o. female who was seen by synchronous (real-time) audio-video technology on 5/11/2020. Consent: Carson Causey, who was seen by synchronous (real-time) audio-video technology, and/or her healthcare decision maker, is aware that this patient-initiated, Telehealth encounter on 5/11/2020 is a billable service, with coverage as determined by her insurance carrier. She is aware that she may receive a bill and has provided verbal consent to proceed: Yes. Assessment & Plan:   Diagnoses and all orders for this visit:    1. Rash and nonspecific skin eruption  Comments:  likely due to allopurinol, april 2020    2. Adverse effect of drug, initial encounter    3. Chronic gout without tophus, unspecified cause, unspecified site    4. CKD (chronic kidney disease) stage 3, GFR 30-59 ml/min ([facility])          I spent at least 40 minutes on this visit with this established patient. [phone]  Subjective:   Carson Causey is a 80 y.o. female who was seen for Skin Problem (red, blotchy, and itchy, dry flakey, and painfull red hands. Pt thinks its due to an allergy x 2 months) and Chills (Pt states she has been having the chills and a temperature for a couple weeks)    Last seen by me feb 13, 2020 for another gout flare, for which she needed another round of prednisone, and finally got better. She was able to start allopurinol then, and has not had any more gout flares. Unfortunately, it appears that shortly after starting the allopurinol, she started to develop a diffuse red, itchy rash. initially started on arms, but has since spread and is now on her chest as well. Does not seem to be as severe on her legs. Her arms are severely dry and cracked, with red splotches throughout. She states she has also been having some low grade temps. None of the rash areas appear to be infected. This is a virtual visit due to covid 19.   Prior to Admission medications    Medication Sig Start Date End Date Taking? Authorizing Provider   apixaban (Eliquis) 2.5 mg tablet Eliquis 2.5 mg tablet   Take 1 tablet twice a day by oral route. Yes Provider, Historical   amiodarone (CORDARONE) 200 mg tablet TAKE 1 TABLET BY MOUTH TWICE A DAY 2/22/20  Yes Provider, Historical   torsemide (DEMADEX) 20 mg tablet TAKE 1 TABLET BY MOUTH EVERY DAY 4/14/20  Yes Provider, Historical   colchicine 0.6 mg tablet Take 1 Tab by mouth every twelve (12) hours as needed for Gout. 3/31/20  Yes Job Arroyo III, DO   atorvastatin (LIPITOR) 10 mg tablet TAKE 1 TABLET BY MOUTH EVERY OTHER DAY 3/8/20  Yes Job Arroyo III, DO   allopurinoL (ZYLOPRIM) 300 mg tablet Take 1 Tab by mouth daily. 2/14/20  Yes Job Arroyo III, DO   vit C/E/Zn/coppr/lutein/zeaxan (PRESERVISION AREDS-2 PO) Take  by mouth daily. Yes Provider, Historical   lansoprazole (PREVACID) 30 mg capsule TAKE 1 CAPSULE BY MOUTH EVERY DAY IN THE MORNING 1/29/20  Yes Job Arroyo III, DO   escitalopram oxalate (LEXAPRO) 10 mg tablet TAKE 1 TABLET BY MOUTH EVERY DAY 1/19/20  Yes Job Arroyo III, DO   levothyroxine (SYNTHROID) 88 mcg tablet TAKE 1 TABLET BY MOUTH EVERY DAY INDICATIONS: HYPOTHYROIDISM 6/16/19  Yes Job Arroyo III, DO   traZODone (DESYREL) 50 mg tablet TAKE 1/2 TABLET BY MOUTH AT BEDTIME 6/16/19  Yes Job Arroyo III, DO   carvedilol (COREG) 3.125 mg tablet TAKE 1 TABLET BY MOUTH TWICE A DAY 11/19/18  Yes Provider, Historical   bumetanide (BUMEX) 1 mg tablet TAKE 1 TABLET EVERY MORNING 12/5/18  Yes Provider, Historical   losartan (COZAAR) 100 mg tablet TAKE 1 TABLET EVERY DAY 12/5/18  Yes Provider, Historical   rivaroxaban (XARELTO) 15 mg tab tablet Take 15 mg by mouth daily. Provider, Historical   ergocalciferol (VITAMIN D2) 50,000 unit capsule Take 1 Cap by mouth every seven (7) days. 1/9/19   Sravanthi WILSON III, DO   aspirin delayed-release 81 mg tablet Take  by mouth daily.     Provider, Historical     Allergies   Allergen Reactions    Bextra [Valdecoxib] Hives     No longer allergic           ROS      Objective:     Visit Vitals  Temp 99 °F (37.2 °C) (Oral)      General: alert, cooperative, no distress   Mental  status: normal mood, behavior, speech, dress, motor activity, and thought processes, able to follow commands   HENT: NCAT   Neck: no visualized mass   Resp: no respiratory distress   Neuro: no gross deficits   Skin: no discoloration or lesions of concern on visible areas   Psychiatric: normal affect, consistent with stated mood, no evidence of hallucinations     Additional exam findings:     1. Diffuse rash--rx for prednisone. Would use vaseline for dry skin. 2.  Adverse drug reaction--timing is suspicious for allopurinol, and about 3% of pts will develop a rash. She stopped the med last week, and would continue to not use it. 3.  Hx gout--allopurinol on hold now. Has colchicine to use if needed  4. pafib--on amio, coreg, and eliquis  5. Hypothyroid--on synthroid  6.  ckd 3--has follow up with renal tomorrow, dr Anel Dobson. rtc for regular visit. If rash not improved by next Monday, or if it worsens before then, she will contact us. We discussed the expected course, resolution and complications of the diagnosis(es) in detail. Medication risks, benefits, costs, interactions, and alternatives were discussed as indicated. I advised her to contact the office if her condition worsens, changes or fails to improve as anticipated. She expressed understanding with the diagnosis(es) and plan. Usman Dodge is a 80 y.o. female who was evaluated by a video visit encounter for concerns as above. Patient identification was verified prior to start of the visit. A caregiver was present when appropriate.  Due to this being a TeleHealth encounter (During [facility]-27 public health emergency), evaluation of the following organ systems was limited: Vitals/Constitutional/EENT/Resp/CV/GI//MS/Neuro/Skin/Heme-Lymph-Imm. Pursuant to the emergency declaration under the [address] [place] [address], [facility] waiver authority and the [employer] and [employer] Act, this Virtual  Visit was conducted, with patient's (and/or legal guardian's) consent, to reduce the patient's risk of exposure to COVID-19 and provide necessary medical care. Services were provided through a video synchronous discussion virtually to substitute for in-person clinic visit. Patient and provider were located at their individual homes.       Colleen Maier III, DO

## 2020-05-11 NOTE — PATIENT INSTRUCTIONS
Rash: Care Instructions Your Care Instructions A rash is any irritation or inflammation of the skin. Rashes have many possible causes, including allergy, infection, illness, heat, and emotional stress. Follow-up care is a key part of your treatment and safety. Be sure to make and go to all appointments, and call your doctor if you are having problems. It's also a good idea to know your test results and keep a list of the medicines you take. How can you care for yourself at home? · Wash the area with water only. Soap can make dryness and itching worse. Pat dry. · Put cold, wet cloths on the rash to reduce itching. · Keep cool, and stay out of the sun. · Leave the rash open to the air as much of the time as possible. · Sometimes petroleum jelly (Vaseline) can help relieve the discomfort caused by a rash. A moisturizing lotion, such as Cetaphil, also may help. Calamine lotion may help for rashes caused by contact with something (such as a plant or soap) that irritated the skin. Use it 3 or 4 times a day. · If your doctor prescribed a cream, use it as directed. If your doctor prescribed medicine, take it exactly as directed. · If your rash itches so badly that it interferes with your normal activities, take an over-the-counter antihistamine, such as diphenhydramine (Benadryl) or loratadine (Claritin). Read and follow all instructions on the label. When should you call for help? Call your doctor now or seek immediate medical care if: 
  · You have signs of infection, such as: 
? Increased pain, swelling, warmth, or redness. ? Red streaks leading from the area. ? Pus draining from the area. ? A fever.  
  · You have joint pain along with the rash.  
 Watch closely for changes in your health, and be sure to contact your doctor if: 
  · Your rash is changing or getting worse. For example, call if you have pain along with the rash, the rash is spreading, or you have new blisters.   · You do not get better after 1 week. Where can you learn more? Go to http://franco-olivier.info/ Enter V340 in the search box to learn more about \"Rash: Care Instructions. \" Current as of: October 30, 2019Content Version: 12.4 © 6877-0862 Healthwise, Incorporated. Care instructions adapted under license by [employer] (which disclaims liability or warranty for this information). If you have questions about a medical condition or this instruction, always ask your healthcare professional. [address] any warranty or liability for your use of this information. Use vaseline after bathing/showering to trap in moisture.

## 2020-05-26 ENCOUNTER — TELEPHONE (OUTPATIENT)
Dept: INTERNAL MEDICINE CLINIC | Age: 85
End: 2020-05-26

## 2020-05-26 RX ORDER — PREDNISONE 20 MG/1
TABLET ORAL
Qty: 21 TAB | Refills: 0 | Status: SHIPPED | OUTPATIENT
Start: 2020-05-26 | End: 2020-06-04 | Stop reason: SDUPTHER

## 2020-05-26 NOTE — TELEPHONE ENCOUNTER
Called, spoke to pt. Two identifiers confirmed. Clarified dosage with pt. Pt taking prednisone 4mg bid. Notified pt Dr. Cynthia Smallwood will send in a new prescription for dosage pt should be taking. Notified pt to stop taking old prednisone. Pt verbalized understanding of information discussed w/ no further questions at this time.

## 2020-05-26 NOTE — TELEPHONE ENCOUNTER
Job Arroyo III, DO  You 27 minutes ago (11:33 AM)     Who gave her the prednisone, b/c what I gave her a few weeks ago she should be done with? I would do another course of prednisone, but need to know that she is done with prior one. Also, is her rash better? Routing comment      Spoke with pt. Pt finished prednisone prescribed by Dr. Juma Holder but had an extra pack of prednisone 20mg on hand.

## 2020-05-26 NOTE — TELEPHONE ENCOUNTER
#[phone] son, Funmi Canas would like to try to get a call back as soon as possible while he is with pt. Pt has a gout flare up in foot & ankle. Pt is scheduled for 5-28-20 as well.

## 2020-05-26 NOTE — TELEPHONE ENCOUNTER
Called, spoke to pt. Two identifiers confirmed. Pt c/o a \"severe\" gout attack. Pt stated the pain is so bad, she is unable to walk around unless she uses her walker. Pt currently taking prednisone and colchicine. Notified pt a message will be sent to Dr. Alphonso Fisher. Pt verbalized understanding of information discussed w/ no further questions at this time.

## 2020-05-28 ENCOUNTER — VIRTUAL VISIT (OUTPATIENT)
Dept: INTERNAL MEDICINE CLINIC | Age: 85
End: 2020-05-28

## 2020-05-28 DIAGNOSIS — M10.9 ACUTE GOUT OF RIGHT FOOT, UNSPECIFIED CAUSE: Primary | ICD-10-CM

## 2020-05-28 DIAGNOSIS — E78.2 MIXED HYPERLIPIDEMIA: ICD-10-CM

## 2020-05-28 DIAGNOSIS — N18.30 CKD (CHRONIC KIDNEY DISEASE) STAGE 3, GFR 30-59 ML/MIN (HCC): ICD-10-CM

## 2020-05-28 DIAGNOSIS — F34.1 DYSTHYMIA: ICD-10-CM

## 2020-05-28 DIAGNOSIS — I10 ESSENTIAL HYPERTENSION: ICD-10-CM

## 2020-05-28 DIAGNOSIS — I48.0 PAROXYSMAL ATRIAL FIBRILLATION (HCC): ICD-10-CM

## 2020-05-28 RX ORDER — METHYLPREDNISOLONE 4 MG/1
TABLET ORAL
COMMUNITY
End: 2020-05-28

## 2020-05-28 RX ORDER — ALLOPURINOL 300 MG/1
TABLET ORAL
COMMUNITY
Start: 2020-05-26 | End: 2020-05-28 | Stop reason: SINTOL

## 2020-05-28 NOTE — PROGRESS NOTES
Tc Ho is a 80 y.o. female who was seen by synchronous (real-time) audio-video technology on 5/28/2020. Consent: Tc Ho, who was seen by synchronous (real-time) audio-video technology, and/or her healthcare decision maker, is aware that this patient-initiated, Telehealth encounter on 5/28/2020 is a billable service, with coverage as determined by her insurance carrier. She is aware that she may receive a bill and has provided verbal consent to proceed: Yes. Assessment & Plan:   Diagnoses and all orders for this visit:    1. Acute gout of right foot, unspecified cause  -     REFERRAL TO RHEUMATOLOGY  -     URIC ACID    2. CKD (chronic kidney disease) stage 3, GFR 30-59 ml/min ([facility])    3. Paroxysmal atrial fibrillation ([facility] [address]    4. Essential hypertension    5. Mixed hyperlipidemia    6. Dysthymia        I spent at least 25 minutes on this visit with this established patient. [phone]  Subjective:   Tc Ho is a 80 y.o. female who was seen for Gout (right ankle/foot; x3 weeks)    Last seen by me may 11, 2020 when she was having a bad rash from allopurinol. Unfortunately after that, she developed a gout flare. Initially was on medrol dose raissa, but that did not help at all. rx sent in yesterday for prednisone 60 mg, which she has been taking now for past few days, and seems to be getting better. She has also been taking colchicine, but is now having diarrhea. She is unable to walk very well due to the gout flare in her right foot. She is now using a walker to get around. Denies f/c or other systemic complaints. She does have some indocin at home from last summer that she has not used lately. This is a virtual visit due to covid 19. Prior to Admission medications    Medication Sig Start Date End Date Taking? Authorizing Provider   methylPREDNISolone (Medrol) 4 mg tab Medrol 4 mg tablet   Take 1 tablet every day by oral route as needed.    Yes Provider, Historical   apixaban (Eliquis) 2.5 mg tablet Eliquis 2.5 mg tablet   Take 1 tablet twice a day by oral route. Yes Provider, Historical   amiodarone (CORDARONE) 200 mg tablet TAKE 1 TABLET BY MOUTH TWICE A DAY 2/22/20  Yes Provider, Historical   torsemide (DEMADEX) 20 mg tablet TAKE 1 TABLET BY MOUTH EVERY DAY 4/14/20  Yes Provider, Historical   colchicine 0.6 mg tablet Take 1 Tab by mouth every twelve (12) hours as needed for Gout. 3/31/20  Yes Job Arroyo III, DO   atorvastatin (LIPITOR) 10 mg tablet TAKE 1 TABLET BY MOUTH EVERY OTHER DAY 3/8/20  Yes Job Arroyo III, DO   vit C/E/Zn/coppr/lutein/zeaxan (PRESERVISION AREDS-2 PO) Take  by mouth daily. Yes Provider, Historical   lansoprazole (PREVACID) 30 mg capsule TAKE 1 CAPSULE BY MOUTH EVERY DAY IN THE MORNING 1/29/20  Yes Job Arroyo III, DO   escitalopram oxalate (LEXAPRO) 10 mg tablet TAKE 1 TABLET BY MOUTH EVERY DAY 1/19/20  Yes Job Arroyo III, DO   levothyroxine (SYNTHROID) 88 mcg tablet TAKE 1 TABLET BY MOUTH EVERY DAY INDICATIONS: HYPOTHYROIDISM 6/16/19  Yes Job Arroyo III, DO   traZODone (DESYREL) 50 mg tablet TAKE 1/2 TABLET BY MOUTH AT BEDTIME 6/16/19  Yes Job Arroyo III, DO   carvedilol (COREG) 3.125 mg tablet TAKE 1 TABLET BY MOUTH TWICE A DAY 11/19/18  Yes Provider, Historical   bumetanide (BUMEX) 1 mg tablet TAKE 1 TABLET EVERY MORNING 12/5/18  Yes Provider, Historical   losartan (COZAAR) 100 mg tablet TAKE 1 TABLET EVERY DAY 12/5/18  Yes Provider, Historical   allopurinoL (ZYLOPRIM) 300 mg tablet  5/26/20   Provider, Historical   predniSONE (DELTASONE) 20 mg tablet Take 3 pills daily x 7 days, then stop. 5/26/20   Dara WILSON III, DO     Allergies   Allergen Reactions    Bextra [Valdecoxib] Hives     No longer allergic           ROS      Objective: There were no vitals taken for this visit.    General: alert, cooperative, no distress   Mental  status: normal mood, behavior, speech, dress, motor activity, and thought processes, able to follow commands   HENT: NCAT   Neck: no visualized mass   Resp: no respiratory distress   Neuro: no gross deficits   Skin: no discoloration or lesions of concern on visible areas   Psychiatric: normal affect, consistent with stated mood, no evidence of hallucinations     Additional exam findings:     1. Acute gout flare--continue with higher dose prednisone. Stop colchicine, take indocin bid for next 6 days as well. rx also to rheum, dr Ana Cruz, as she had bad rash to allopurinol. Check uric acid levels again, as she is having blood work tomorrow with dr Severiano England. 2.  ckd 3--has appt tomorrow with dr Severiano England  3. pafib--on amio, coreg, eliquis  4  htn--on coreg, cozaar, demadex  5. Anxiety and depression--continue lexapro, trazodone  6. Hypothyroid--on synthroid    rtc for regular visit. We discussed the expected course, resolution and complications of the diagnosis(es) in detail. Medication risks, benefits, costs, interactions, and alternatives were discussed as indicated. I advised her to contact the office if her condition worsens, changes or fails to improve as anticipated. She expressed understanding with the diagnosis(es) and plan. Lakeisha Weller is a 80 y.o. female who was evaluated by a video visit encounter for concerns as above. Patient identification was verified prior to start of the visit. A caregiver was present when appropriate. Due to this being a TeleHealth encounter (During [facility]-15 public health emergency), evaluation of the following organ systems was limited: Vitals/Constitutional/EENT/Resp/CV/GI//MS/Neuro/Skin/Heme-Lymph-Imm.   Pursuant to the emergency declaration under the [facility], [address] [place] [address] authority and the [employer] and [employer] Act, this Virtual  Visit was conducted, with patient's (and/or legal guardian's) consent, to reduce the patient's risk of exposure to COVID-19 and provide necessary medical care. Services were provided through a video synchronous discussion virtually to substitute for in-person clinic visit. Patient and provider were located at their individual homes.       Bina Enriquez III, DO

## 2020-05-28 NOTE — PATIENT INSTRUCTIONS
Office Policies Phone calls/patient messages: Please allow up to 24 hours for someone in the office to contact you about your call or message. Be mindful your provider may be out of the office or your message may require further review. We encourage you to use [employer] for your messages as this is a faster, more efficient way to communicate with our office Medication Refills: 
         
Prescription medications require 48-72 business hours to process. We encourage you to use [employer] for your refills. For controlled medications: Please allow 72 business hours to process. Certain medications may require you to  a written prescription at our office. NO narcotic/controlled medications will be prescribed after 4pm Monday through Friday or on weekends Form/Paperwork Completion: 
         
Please note a $25 fee may incur for all paperwork for completed by our providers. We ask that you allow 7-10 business days. Pre-payment is due prior to picking up/faxing the completed form. You may also download your forms to [employer] to have your doctor print off. This is an established visit conducted via telemedicine. The patient has been instructed that this meets HIPAA criteria and acknowledges and agrees to this method of visitation.  
 
Precious Olivera LPN 
46/78/10 
4:73 AM

## 2020-05-29 NOTE — TELEPHONE ENCOUNTER
[facility]   Phone Number: [phone]             Caller's first and last name: Waqar Snow   Reason for call: Son stated Dr. Karmen Burkitt does not have an appointment before 6/18/20. Requesting Dr. Henriquez Daily, send an EPIC message and they may see pt earlier. Marlane Beady dose \"Prednisone\" does not seem to be working. Callback required yes/no and why: Yes, to inform.    Best contact number(s): [phone]   Details to clarify the request: N/A     Copy/Paste  [email or web address]

## 2020-05-31 RX ORDER — LEVOTHYROXINE SODIUM 88 UG/1
TABLET ORAL
Qty: 90 TAB | Refills: 3 | Status: SHIPPED | OUTPATIENT
Start: 2020-05-31

## 2020-06-01 ENCOUNTER — VIRTUAL VISIT (OUTPATIENT)
Dept: INTERNAL MEDICINE CLINIC | Age: 85
End: 2020-06-01

## 2020-06-01 DIAGNOSIS — I10 ESSENTIAL HYPERTENSION: ICD-10-CM

## 2020-06-01 DIAGNOSIS — N18.30 CKD (CHRONIC KIDNEY DISEASE) STAGE 3, GFR 30-59 ML/MIN (HCC): ICD-10-CM

## 2020-06-01 DIAGNOSIS — T14.8XXA BLOOD BLISTER: Primary | ICD-10-CM

## 2020-06-01 DIAGNOSIS — I48.0 PAROXYSMAL ATRIAL FIBRILLATION (HCC): ICD-10-CM

## 2020-06-01 DIAGNOSIS — T14.8XXA WOUND INFECTION: ICD-10-CM

## 2020-06-01 DIAGNOSIS — M10.9 ACUTE GOUT OF RIGHT FOOT, UNSPECIFIED CAUSE: ICD-10-CM

## 2020-06-01 DIAGNOSIS — E03.9 ACQUIRED HYPOTHYROIDISM: ICD-10-CM

## 2020-06-01 DIAGNOSIS — L08.9 WOUND INFECTION: ICD-10-CM

## 2020-06-01 RX ORDER — CEPHALEXIN 500 MG/1
500 CAPSULE ORAL 4 TIMES DAILY
Qty: 40 CAP | Refills: 0 | Status: SHIPPED | OUTPATIENT
Start: 2020-06-01 | End: 2020-06-11

## 2020-06-01 NOTE — PROGRESS NOTES
Theron Davila is a 80 y.o. female who was seen by synchronous (real-time) audio-video technology on 6/1/2020. Consent: Theron Davila, who was seen by synchronous (real-time) audio-video technology, and/or her healthcare decision maker, is aware that this patient-initiated, Telehealth encounter on 6/1/2020 is a billable service, with coverage as determined by her insurance carrier. She is aware that she may receive a bill and has provided verbal consent to proceed: Yes. Assessment & Plan:   Diagnoses and all orders for this visit:    1. Blood blister    2. Acute gout of right foot, unspecified cause    3. CKD (chronic kidney disease) stage 3, GFR 30-59 ml/min ([facility])    4. Paroxysmal atrial fibrillation ([facility] [address]    5. Essential hypertension    6. Acquired hypothyroidism    7. Wound infection  -     REFERRAL TO WOUND CARE    Other orders  -     cephALEXin (KEFLEX) 500 mg capsule; Take 1 Cap by mouth four (4) times daily for 10 days. I spent at least 25 minutes on this visit with this established patient. 712  Subjective:   Theron Davila is a 80 y.o. female who was seen for Toe Pain (sore on right big toe; bloody clear drainage )    Last seen by me may 28, 2020 for gout flare. At that time, she had a large 50 cent sized blood blister on medial part of her right great toe. It opened up and started to drain Saturday, and has continued to do so. The dorsum of her foot around this wound is red in appearance as well now. The drainage is pink tinged, but not viscous. There does appear to be some purulent material in the base of the wound. Denies any f/c. Her gout pain has resolved at least.    This is a virtual visit due to covid 19. Prior to Admission medications    Medication Sig Start Date End Date Taking?  Authorizing Provider   levothyroxine (SYNTHROID) 88 mcg tablet TAKE 1 TABLET BY MOUTH EVERY DAY INDICATIONS: HYPOTHYROIDISM 5/31/20  Yes Bala Perez, DO   predniSONE ([place]) 20 mg tablet Take 3 pills daily x 7 days, then stop. 5/26/20  Yes Job Arroyo III, DO   apixaban (Eliquis) 2.5 mg tablet Eliquis 2.5 mg tablet   Take 1 tablet twice a day by oral route. Yes Provider, Historical   amiodarone (CORDARONE) 200 mg tablet TAKE 1 TABLET BY MOUTH TWICE A DAY 2/22/20  Yes Provider, Historical   torsemide (DEMADEX) 20 mg tablet TAKE 1 TABLET BY MOUTH EVERY DAY 4/14/20  Yes Provider, Historical   atorvastatin (LIPITOR) 10 mg tablet TAKE 1 TABLET BY MOUTH EVERY OTHER DAY 3/8/20  Yes Job Arroyo III, DO   vit C/E/Zn/coppr/lutein/zeaxan (PRESERVISION AREDS-2 PO) Take  by mouth daily. Yes Provider, Historical   lansoprazole (PREVACID) 30 mg capsule TAKE 1 CAPSULE BY MOUTH EVERY DAY IN THE MORNING 1/29/20  Yes Job Arroyo III, DO   escitalopram oxalate (LEXAPRO) 10 mg tablet TAKE 1 TABLET BY MOUTH EVERY DAY 1/19/20  Yes Job Arroyo III, DO   traZODone (DESYREL) 50 mg tablet TAKE 1/2 TABLET BY MOUTH AT BEDTIME 6/16/19  Yes Job Arroyo III, DO   carvedilol (COREG) 3.125 mg tablet TAKE 1 TABLET BY MOUTH TWICE A DAY 11/19/18  Yes Provider, Historical   bumetanide (BUMEX) 1 mg tablet TAKE 1 TABLET EVERY MORNING 12/5/18  Yes Provider, Historical   losartan (COZAAR) 100 mg tablet TAKE 1 TABLET EVERY DAY 12/5/18  Yes Provider, Historical     Allergies   Allergen Reactions    Bextra [Valdecoxib] Hives     No longer allergic           ROS      Objective: There were no vitals taken for this visit. General: alert, cooperative, no distress   Mental  status: normal mood, behavior, speech, dress, motor activity, and thought processes, able to follow commands   HENT: NCAT   Neck: no visualized mass   Resp: no respiratory distress   Neuro: no gross deficits   Skin: no discoloration or lesions of concern on visible areas   Psychiatric: normal affect, consistent with stated mood, no evidence of hallucinations     Additional exam findings:     1.   Right great toe wound infection--rx for keflex. Referral to wound care clinic. If they can't see her, then will have [place] rn see her for wound care. 2.  Gout flare--has finally resolved, still has few more days of prednisone. Has appt with rheum in a few weeks, dr Lissy Stanton. Developed back rash from allopurinol  3.  ckd 3--relatively stable. Follows with dr Walter Rodriguez  4. pafib--on amio, coreg, eliquis  5. Depression--on trazodone  6. Hypothyroid--on synthroid  7.  htn--cozaar    rtc for regular visit      We discussed the expected course, resolution and complications of the diagnosis(es) in detail. Medication risks, benefits, costs, interactions, and alternatives were discussed as indicated. I advised her to contact the office if her condition worsens, changes or fails to improve as anticipated. She expressed understanding with the diagnosis(es) and plan. Sherryle Holter is a 80 y.o. female who was evaluated by a video visit encounter for concerns as above. Patient identification was verified prior to start of the visit. A caregiver was present when appropriate. Due to this being a TeleHealth encounter (During ZQLUU-37 public health emergency), evaluation of the following organ systems was limited: Vitals/Constitutional/EENT/Resp/CV/GI//MS/Neuro/Skin/Heme-Lymph-Imm. Pursuant to the emergency declaration under the [facility], 1135 waiver authority and the [employer] and [employer] General Act, this Virtual  Visit was conducted, with patient's (and/or legal guardian's) consent, to reduce the patient's risk of exposure to COVID-19 and provide necessary medical care. Services were provided through a video synchronous discussion virtually to substitute for in-person clinic visit. Patient and provider were located at their individual homes.       Jose Marley III, DO

## 2020-06-01 NOTE — PATIENT INSTRUCTIONS
Office Policies Phone calls/patient messages: Please allow up to 24 hours for someone in the office to contact you about your call or message. Be mindful your provider may be out of the office or your message may require further review. We encourage you to use [employer] for your messages as this is a faster, more efficient way to communicate with our office Medication Refills: 
         
Prescription medications require 48-72 business hours to process. We encourage you to use [employer] for your refills. For controlled medications: Please allow 72 business hours to process. Certain medications may require you to  a written prescription at our office. NO narcotic/controlled medications will be prescribed after 4pm Monday through Friday or on weekends Form/Paperwork Completion: 
         
Please note a $25 fee may incur for all paperwork for completed by our providers. We ask that you allow 7-10 business days. Pre-payment is due prior to picking up/faxing the completed form. You may also download your forms to [employer] to have your doctor print off. This is an established visit conducted via telemedicine. The patient has been instructed that this meets HIPAA criteria and acknowledges and agrees to this method of visitation.  
 
Samira Perez LPN 
85/86/33 
5:88 PM

## 2020-06-02 ENCOUNTER — HOME HEALTH ADMISSION (OUTPATIENT)
Dept: HOME HEALTH SERVICES | Facility: HOME HEALTH | Age: 85
End: 2020-06-02
Payer: MEDICARE

## 2020-06-02 ENCOUNTER — TELEPHONE (OUTPATIENT)
Dept: INTERNAL MEDICINE CLINIC | Age: 85
End: 2020-06-02

## 2020-06-02 DIAGNOSIS — T14.8XXA WOUND INFECTION: Primary | ICD-10-CM

## 2020-06-02 DIAGNOSIS — L08.9 WOUND INFECTION: Primary | ICD-10-CM

## 2020-06-02 NOTE — TELEPHONE ENCOUNTER
#[phone]  Dioni Nolasco states that the wound care across the street is not returning his calls pertaining to an appt for his mom. Micaelaamber Constance would like to have a wound care nurse come to the home. Please call to arrange this and call him back. Thanks.

## 2020-06-03 ENCOUNTER — HOME CARE VISIT (OUTPATIENT)
Dept: HOME HEALTH SERVICES | Facility: HOME HEALTH | Age: 85
End: 2020-06-03

## 2020-06-04 ENCOUNTER — HOME CARE VISIT (OUTPATIENT)
Dept: SCHEDULING | Facility: HOME HEALTH | Age: 85
End: 2020-06-04
Payer: MEDICARE

## 2020-06-04 ENCOUNTER — TELEPHONE (OUTPATIENT)
Dept: INTERNAL MEDICINE CLINIC | Age: 85
End: 2020-06-04

## 2020-06-04 PROCEDURE — A6252 ABSORPT DRG >16 <=48 W/O BDR: HCPCS

## 2020-06-04 PROCEDURE — A9270 NON-COVERED ITEM OR SERVICE: HCPCS

## 2020-06-04 PROCEDURE — A4452 WATERPROOF TAPE: HCPCS

## 2020-06-04 PROCEDURE — A6197 ALGINATE DRSG >16 <=48 SQ IN: HCPCS

## 2020-06-04 PROCEDURE — 3331090002 HH PPS REVENUE DEBIT

## 2020-06-04 PROCEDURE — 3331090001 HH PPS REVENUE CREDIT

## 2020-06-04 PROCEDURE — A6446 CONFORM BAND S W>=3" <5"/YD: HCPCS

## 2020-06-04 PROCEDURE — G0299 HHS/HOSPICE OF RN EA 15 MIN: HCPCS

## 2020-06-04 PROCEDURE — 400013 HH SOC

## 2020-06-04 RX ORDER — PREDNISONE 20 MG/1
TABLET ORAL
Qty: 21 TAB | Refills: 2 | Status: SHIPPED | OUTPATIENT
Start: 2020-06-04 | End: 2020-06-23

## 2020-06-04 NOTE — TELEPHONE ENCOUNTER
Caller's first and last name: Xander/Son   Reason for call: Son stated pt is going through another bout of gout, wants prescription for prednisone called in, needs to be 20 mg tab.  Asked if prescription could be refill, in case needed in the future. Callback required yes/no and why: Yes, to inform.    Best contact number(s): [phone]   Details to clarify the request: N/A

## 2020-06-05 ENCOUNTER — HOME CARE VISIT (OUTPATIENT)
Dept: SCHEDULING | Facility: HOME HEALTH | Age: 85
End: 2020-06-05
Payer: MEDICARE

## 2020-06-05 ENCOUNTER — TELEPHONE (OUTPATIENT)
Dept: INTERNAL MEDICINE CLINIC | Age: 85
End: 2020-06-05

## 2020-06-05 VITALS
TEMPERATURE: 97.5 F | OXYGEN SATURATION: 97 % | HEART RATE: 62 BPM | DIASTOLIC BLOOD PRESSURE: 60 MMHG | SYSTOLIC BLOOD PRESSURE: 118 MMHG | RESPIRATION RATE: 18 BRPM

## 2020-06-05 PROCEDURE — 3331090002 HH PPS REVENUE DEBIT

## 2020-06-05 PROCEDURE — G0300 HHS/HOSPICE OF LPN EA 15 MIN: HCPCS

## 2020-06-05 PROCEDURE — 3331090001 HH PPS REVENUE CREDIT

## 2020-06-05 NOTE — TELEPHONE ENCOUNTER
#[phone]  Son, Mr. Soledad Ochoa states he needs a referral to a new rheumatologist as the one you referred her to has postponed the appt until 7-7-20 and he is not pleased about this. Please call.

## 2020-06-06 ENCOUNTER — HOME CARE VISIT (OUTPATIENT)
Dept: SCHEDULING | Facility: HOME HEALTH | Age: 85
End: 2020-06-06
Payer: MEDICARE

## 2020-06-06 ENCOUNTER — HOME CARE VISIT (OUTPATIENT)
Dept: HOME HEALTH SERVICES | Facility: HOME HEALTH | Age: 85
End: 2020-06-06
Payer: MEDICARE

## 2020-06-06 VITALS
SYSTOLIC BLOOD PRESSURE: 148 MMHG | TEMPERATURE: 98.2 F | HEART RATE: 69 BPM | OXYGEN SATURATION: 98 % | DIASTOLIC BLOOD PRESSURE: 68 MMHG | RESPIRATION RATE: 18 BRPM

## 2020-06-06 PROCEDURE — 3331090001 HH PPS REVENUE CREDIT

## 2020-06-06 PROCEDURE — G0300 HHS/HOSPICE OF LPN EA 15 MIN: HCPCS

## 2020-06-06 PROCEDURE — 3331090002 HH PPS REVENUE DEBIT

## 2020-06-07 ENCOUNTER — HOME CARE VISIT (OUTPATIENT)
Dept: HOME HEALTH SERVICES | Facility: HOME HEALTH | Age: 85
End: 2020-06-07
Payer: MEDICARE

## 2020-06-07 ENCOUNTER — HOME CARE VISIT (OUTPATIENT)
Dept: SCHEDULING | Facility: HOME HEALTH | Age: 85
End: 2020-06-07
Payer: MEDICARE

## 2020-06-07 VITALS
RESPIRATION RATE: 16 BRPM | HEIGHT: 61 IN | SYSTOLIC BLOOD PRESSURE: 122 MMHG | DIASTOLIC BLOOD PRESSURE: 56 MMHG | BODY MASS INDEX: 25.11 KG/M2 | OXYGEN SATURATION: 97 % | HEART RATE: 72 BPM | TEMPERATURE: 98.3 F | WEIGHT: 133 LBS

## 2020-06-07 VITALS
RESPIRATION RATE: 18 BRPM | HEART RATE: 70 BPM | SYSTOLIC BLOOD PRESSURE: 148 MMHG | TEMPERATURE: 98.2 F | DIASTOLIC BLOOD PRESSURE: 60 MMHG | OXYGEN SATURATION: 97 %

## 2020-06-07 PROCEDURE — 3331090002 HH PPS REVENUE DEBIT

## 2020-06-07 PROCEDURE — G0300 HHS/HOSPICE OF LPN EA 15 MIN: HCPCS

## 2020-06-07 PROCEDURE — 3331090001 HH PPS REVENUE CREDIT

## 2020-06-08 ENCOUNTER — HOME CARE VISIT (OUTPATIENT)
Dept: SCHEDULING | Facility: HOME HEALTH | Age: 85
End: 2020-06-08
Payer: MEDICARE

## 2020-06-08 VITALS
HEART RATE: 66 BPM | TEMPERATURE: 98.4 F | DIASTOLIC BLOOD PRESSURE: 60 MMHG | OXYGEN SATURATION: 98 % | SYSTOLIC BLOOD PRESSURE: 100 MMHG

## 2020-06-08 PROCEDURE — G0299 HHS/HOSPICE OF RN EA 15 MIN: HCPCS

## 2020-06-08 PROCEDURE — 3331090001 HH PPS REVENUE CREDIT

## 2020-06-08 PROCEDURE — 3331090002 HH PPS REVENUE DEBIT

## 2020-06-09 ENCOUNTER — HOSPITAL ENCOUNTER (OUTPATIENT)
Dept: WOUND CARE | Age: 85
Discharge: HOME | End: 2020-06-09
Payer: COMMERCIAL

## 2020-06-09 VITALS
RESPIRATION RATE: 16 BRPM | SYSTOLIC BLOOD PRESSURE: 150 MMHG | DIASTOLIC BLOOD PRESSURE: 67 MMHG | TEMPERATURE: 97.8 F | HEART RATE: 87 BPM

## 2020-06-09 DIAGNOSIS — Z98.890 S/P FOOT SURGERY, RIGHT: Primary | ICD-10-CM

## 2020-06-09 PROBLEM — M1A.3711: Status: ACTIVE | Noted: 2020-06-09

## 2020-06-09 PROBLEM — L97.413: Status: ACTIVE | Noted: 2020-06-09

## 2020-06-09 PROCEDURE — 11044 DBRDMT BONE 1ST 20 SQ CM/<: CPT

## 2020-06-09 PROCEDURE — 3331090002 HH PPS REVENUE DEBIT

## 2020-06-09 PROCEDURE — 3331090001 HH PPS REVENUE CREDIT

## 2020-06-09 RX ORDER — HYDROCODONE BITARTRATE AND ACETAMINOPHEN 5; 325 MG/1; MG/1
1 TABLET ORAL
Qty: 18 TAB | Refills: 0 | Status: SHIPPED | OUTPATIENT
Start: 2020-06-09 | End: 2020-06-12

## 2020-06-09 RX ORDER — ACETAMINOPHEN 325 MG/1
650 TABLET ORAL
COMMUNITY

## 2020-06-09 NOTE — H&P
Patient presents to wound clinic for: Carson Causey is a 80 y.o. female who presents for initial wound care of the following: medial right first metatarsophalangeal joint wound. Patient is accompanied at today's appointment by her son who helps relate much of her history. Patient has a history of gout with multiple flare-ups as well as a history of chronic kidney disease. Several months ago, she was started on allopurinol and developed an allergic reaction to it and developed a rash all over as a result, which was particularly worse on the extremities. The allopurinol was discontinued. She then had another gout flareup and patient was started on a prednisone dose pack. The recent gout flareups have made it very difficult for her to walk around. Subsequently, she developed a large blood blister in the area of the 1st MPJ and the lesion opened open and began to drain about 1-2 weeks ago. Her PCP has started her on keflex. She has just finished up the prednisone. Home health care has been doing her dressing changes. Denies f/c/n/v/d. Pertinent Medical History:  Past Medical History:   Diagnosis Date    Arthritis     GERD (gastroesophageal reflux disease)     Hypertension     Psychiatric disorder     Thyroid disease      History reviewed. No pertinent surgical history. Prior to Admission medications    Medication Sig Start Date End Date Taking? Authorizing Provider   acetaminophen (TylenoL) 325 mg tablet Take 650 mg by mouth every six (6) hours as needed for Pain. Yes Provider, Historical   HYDROcodone-acetaminophen (NORCO) 5-325 mg per tablet Take 1 Tab by mouth every four (4) hours as needed for Pain for up to 3 days. Max Daily Amount: 6 Tabs. 6/9/20 6/12/20 Yes Mery Frausto DPM   predniSONE (DELTASONE) 20 mg tablet Take 3 pills daily x 7 days, then stop. 6/4/20  Yes Job Arroyo III, DO   cephALEXin (KEFLEX) 500 mg capsule Take 1 Cap by mouth four (4) times daily for 10 days.  6/1/20 6/11/20 Yes Liang Arroyo III, DO   levothyroxine (SYNTHROID) 88 mcg tablet TAKE 1 TABLET BY MOUTH EVERY DAY INDICATIONS: HYPOTHYROIDISM 5/31/20  Yes Job Arroyo III, DO   apixaban (Eliquis) 2.5 mg tablet Eliquis 2.5 mg tablet   Take 1 tablet twice a day by oral route. Yes Provider, Historical   amiodarone (CORDARONE) 200 mg tablet TAKE 1 TABLET BY MOUTH TWICE A DAY 2/22/20  Yes Provider, Historical   torsemide (DEMADEX) 20 mg tablet TAKE 1 TABLET BY MOUTH EVERY DAY 4/14/20  Yes Provider, Historical   atorvastatin (LIPITOR) 10 mg tablet TAKE 1 TABLET BY MOUTH EVERY OTHER DAY 3/8/20  Yes Job Arroyo III, DO   vit C/E/Zn/coppr/lutein/zeaxan (PRESERVISION AREDS-2 PO) Take  by mouth daily.    Yes Provider, Historical   lansoprazole (PREVACID) 30 mg capsule TAKE 1 CAPSULE BY MOUTH EVERY DAY IN THE MORNING 1/29/20  Yes Job Arroyo III, DO   escitalopram oxalate (LEXAPRO) 10 mg tablet TAKE 1 TABLET BY MOUTH EVERY DAY 1/19/20  Yes Job Arroyo III, DO   carvedilol (COREG) 3.125 mg tablet TAKE 1 TABLET BY MOUTH TWICE A DAY 11/19/18  Yes Provider, Historical   losartan (COZAAR) 100 mg tablet TAKE 1 TABLET EVERY DAY 12/5/18  Yes Provider, Historical   traZODone (DESYREL) 50 mg tablet TAKE 1/2 TABLET BY MOUTH AT BEDTIME 6/16/19   Liang Arroyo III, DO     Allergies   Allergen Reactions    Bextra [Valdecoxib] Hives     No longer allergic     Family History   Problem Relation Age of Onset    Hypertension Mother     Heart Disease Mother     Cancer Maternal Aunt         breast     Social History     Socioeconomic History    Marital status:      Spouse name: Not on file    Number of children: Not on file    Years of education: Not on file    Highest education level: Not on file   Occupational History    Not on file   Social Needs    Financial resource strain: Not on file    Food insecurity     Worry: Not on file     Inability: Not on file   Act[employer] needs     Medical: Not on file     Non-medical: Not on file   Tobacco Use    Smoking status: Never Smoker    Smokeless tobacco: Never Used   Substance and Sexual Activity    Alcohol use: Yes     Alcohol/week: 1.0 standard drinks     Types: 1 Glasses of wine per week     Comment: occasional    Drug use: Yes     Types: Prescription    Sexual activity: Not Currently   Lifestyle    Physical activity     Days per week: Not on file     Minutes per session: Not on file    Stress: Not on file   Relationships    Social connections     Talks on phone: Not on file     Gets together: Not on file     Attends Anabaptism service: Not on file     Active member of club or organization: Not on file     Attends meetings of clubs or organizations: Not on file     Relationship status: Not on file    Intimate partner violence     Fear of current or ex partner: Not on file     Emotionally abused: Not on file     Physically abused: Not on file     Forced sexual activity: Not on file   Other Topics Concern    Not on file   Social History Narrative    Not on file       Vitals:    06/09/20 1506   BP: 150/67   Pulse: 87   Resp: 16   Temp: 97.8 °F (36.6 °C)       Review of Systems:    Gen: No fever, chills, malaise, weight loss/gain. Heent: No headache, rhinorrhea, epistaxis, ear pain, hearing loss, sinus pain, neck pain/stiffness, sore throat. Heart: No chest pain, palpitations, ARTEAGA, pnd, or orthopnea. Resp: No cough, hemoptysis, wheezing and shortness of breath. GI: No nausea, vomiting, diarrhea, constipation, melena or hematochezia. : No urinary obstruction, dysuria or hematuria. Derm: see below  Musc/skeletal: no bone or joint complains. Vasc: No edema, cyanosis or claudication. Endo: No heat/cold intolerance, no polyuria,polydipsia or polyphagia. Neuro: No unilateral weakness, numbness, tingling. No seizures. Heme: No easy bruising or bleeding.     Wound Description:   Wound Type: Blister/Gout   Indication: Acute <30days   Status: [initial/improving/no change/deteriorating]   Measurements:    Wound Length:  2      Wound Width : 2.6      Wound Depth : 0.5   Tissue Type:   Epithelial: without necrosis  Granulation: without necrosis  Subcutaneous: without necrosis  Slough: yes  Eschar: NA  Tendon: without necrosis  Fascia: some necrosis to the joint capsule  Muscle: without necrosis  Bone: without necrosis   Drainage: serous drainage; copious amounts of gouty tophi throughout the MPJ and adherent the the base of the proximal phalanx and the first metatarsal head    Debridement: required today to promote wound healing    Character of Ulcer: New     Indication for Debridement: Slough, Exudate, Abnormal wound edge and Abnormal Wound base     Pre debridement measurements: 2 cm x 2.6 cm x 0.5 cm    Risks of procedure were discussed with patient. Consent has been signed. Anesthetic: Lidocaine 4% topical cream     Level of Debridement: Subctaneous Tissue , Muscle, Bone     Tissue and/or Material removed: Bone, Exudate, Fibrin/ Slough, Ligament, Skin and Subcutaneous    Type of Tissue: Viable      Pre-debridement severity: Necrosis of Muscle     Post debridement severity: Necrosis of Bone     Instrument(s) used: Scalpel, Curette and Ronguer    Bleeding controlled with: Pressure and Silver Nitrate    Estimated blood loss: less than 50 ml    Pre debridement measurements: 2.1 cm x 2.8 cm x 0.7 cm    Post procedural pain: mild    Patient tolerated procedure well. Infection:no     Edema:NA    Lower Extremity Circulation   Palpable pulses    Assessment:   1. Ulcer of right medial 1st MPJ with exposed bone  2. Gout    Plan:   -Patient seen and assessed. 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   I explained that many times gouty tophi builds up in the joint over the years and can cause excessive thinning of the joint capsule as well and degenerative changes of the joint. When the patient happened to have the latest gout attack at the same time as the allergic reaction to the allopurinol on top of the thinning skin due to the advanced age, her skin brok down and the wound formed. Unforunately around this time, the joint capsule of the 1st MPJ deteriorated as well and gouty tophi began to spill out.  -Wound debrided as noted above. A significant amount of 1st MPJ joint capsule was debrided as well as a small amount of bone that was protruding from the wound to allow for better wound healing. A copious amount of tophaceous crystals was removed from the joint and the joint was irrigated.   -Patient given script for norco to help with pain control for next several days due to severity of debridement.  -Intrasite gel, aquacel, gauze.  -Follow-up 2 weeks

## 2020-06-09 NOTE — DISCHARGE INSTRUCTIONS
Discharge Instructions/Wound Orders  [facility]  [address]  [place], [address]  Telephone: [phone]    NAME:  Scar Denise OF BIRTH:  5/24/1926  MEDICAL RECORD NUMBER:  [id number]  DATE:  6/9/2020    Wound Care Orders:  Right great toe joint wound - cleanse with saline, apply Instrasite gel, cover with aquacel AG, gauze and roll gauze, apply single layer tubigrip size F (no Coban) change every other day. Return to clinic in 2 weeks for MD follow-up. Dietary:  [x] Diet as tolerated: [] Calorie Diabetic Diet: [x] No Added Salt:  [x] Increase Protein: [] Other:   Activity:  [x] Activity as tolerated:  [] Patient has no activity restrictions     [] Strict Bedrest: [] Remain off Work:     [] May return to full duty work:                                   [] Return to work with restrictions:             Return Appointment:  [] Wound and dressing supply provider:   [] ECF or Home Healthcare:  [] Wound Assessment: [] Physician or NP scheduled for Wound Assessment:   [] Return Appointment: With DR Radha Antonio   in  [facility])  [] Ordered tests:      Electronically signed John Alfred RN on 6/9/2020 at 4:06 PM     Carrie Byrnes 281: Should you experience any significant changes in your wound(s) or have questions about your wound care, please contact the [address] [place] [address] at [address] [place] [address] 8:00 am - 4:30. If you need help with your wound outside these hours and cannot wait until we are again available, contact your PCP or go to the hospital emergency room. PLEASE NOTE: IF YOU ARE UNABLE TO OBTAIN WOUND SUPPLIES, CONTINUE TO USE THE SUPPLIES YOU HAVE AVAILABLE UNTIL YOU ARE ABLE TO REACH US. IT IS MOST IMPORTANT TO KEEP THE WOUND COVERED AT ALL TIMES.      Physician Signature:_______________________    Date: ___________ Time:  ____________

## 2020-06-09 NOTE — WOUND CARE
06/09/20 1511 Wound Foot Right;Medial  
Date First Assessed/Time First Assessed: 06/09/20 1510   Present on Hospital Admission: Yes  Wound Approximate Age at First Assessment (Weeks): 2 weeks  Primary Wound Type: Blister/bullae  Location: Foot  Wound Location Orientation: Right;Medial  
Dressing Status Removed Dressing Type Aquacel;Gauze;Gauze wrap (jason); Special tape (comment) (coban) Wound Length (cm) 2 cm Wound Width (cm) 2.6 cm Wound Depth (cm) 0.5 cm Wound Surface Area (cm^2) 5.2 cm^2 Wound Volume (cm^3) 2.6 cm^3 Condition of Base Slough;Pink Condition of Edges Open Drainage Amount Large Drainage Color Serous Wound Odor None Dian-wound Assessment Intact Cleansing and Cleansing Agents  Normal saline Visit Vitals /67 Pulse 87 Temp 97.8 °F (36.6 °C) Resp 16 LLE Peripheral Vascular Capillary Refill: Less than/equal to 3 seconds (06/09/20 1508) Color: Appropriate for race (06/09/20 1508) Temperature: Warm (06/09/20 1508) Sensation: Present (06/09/20 1508) Pedal Pulse: Palpable (06/09/20 1508) Circumference of Calf (cm): 39 cm (06/09/20 1508) Location of Measurement (Calf): Mid  (06/09/20 1508) Circumference of Ankle (cm): 24 cm (06/09/20 1508) Location of Measurement (Ankle): Upper  (06/09/20 1508) RLE Peripheral Vascular Capillary Refill: Less than/equal to 3 seconds (06/09/20 1508) Color: Appropriate for race (06/09/20 1508) Temperature: Warm (06/09/20 1508) Sensation: Present (06/09/20 1508) Pedal Pulse: Doppler;Palpable (06/09/20 1508) Circumference of Calf (cm): 34 cm (06/09/20 1508) Location of Measurement (Calf): Mid  (06/09/20 1508) Circumference of Ankle (cm): 22 cm (06/09/20 1508) Location of Measurement (Ankle): Upper  (06/09/20 1508)

## 2020-06-10 ENCOUNTER — HOME CARE VISIT (OUTPATIENT)
Dept: SCHEDULING | Facility: HOME HEALTH | Age: 85
End: 2020-06-10
Payer: MEDICARE

## 2020-06-10 VITALS
TEMPERATURE: 97.8 F | HEART RATE: 67 BPM | SYSTOLIC BLOOD PRESSURE: 118 MMHG | DIASTOLIC BLOOD PRESSURE: 70 MMHG | RESPIRATION RATE: 18 BRPM | OXYGEN SATURATION: 98 %

## 2020-06-10 PROCEDURE — 3331090002 HH PPS REVENUE DEBIT

## 2020-06-10 PROCEDURE — G0300 HHS/HOSPICE OF LPN EA 15 MIN: HCPCS

## 2020-06-10 PROCEDURE — 3331090001 HH PPS REVENUE CREDIT

## 2020-06-11 PROCEDURE — 3331090002 HH PPS REVENUE DEBIT

## 2020-06-11 PROCEDURE — 3331090001 HH PPS REVENUE CREDIT

## 2020-06-12 ENCOUNTER — HOME CARE VISIT (OUTPATIENT)
Dept: SCHEDULING | Facility: HOME HEALTH | Age: 85
End: 2020-06-12
Payer: MEDICARE

## 2020-06-12 VITALS
SYSTOLIC BLOOD PRESSURE: 142 MMHG | DIASTOLIC BLOOD PRESSURE: 60 MMHG | HEART RATE: 77 BPM | OXYGEN SATURATION: 97 % | TEMPERATURE: 99 F

## 2020-06-12 PROCEDURE — 3331090002 HH PPS REVENUE DEBIT

## 2020-06-12 PROCEDURE — G0299 HHS/HOSPICE OF RN EA 15 MIN: HCPCS

## 2020-06-12 PROCEDURE — 3331090001 HH PPS REVENUE CREDIT

## 2020-06-13 PROCEDURE — 3331090001 HH PPS REVENUE CREDIT

## 2020-06-13 PROCEDURE — 3331090002 HH PPS REVENUE DEBIT

## 2020-06-14 PROCEDURE — 3331090002 HH PPS REVENUE DEBIT

## 2020-06-14 PROCEDURE — 3331090001 HH PPS REVENUE CREDIT

## 2020-06-15 ENCOUNTER — TELEPHONE (OUTPATIENT)
Dept: INTERNAL MEDICINE CLINIC | Age: 85
End: 2020-06-15

## 2020-06-15 DIAGNOSIS — M86.9 OSTEOMYELITIS OF RIGHT FOOT, UNSPECIFIED TYPE (HCC): Primary | ICD-10-CM

## 2020-06-15 PROCEDURE — 3331090002 HH PPS REVENUE DEBIT

## 2020-06-15 PROCEDURE — 3331090001 HH PPS REVENUE CREDIT

## 2020-06-15 NOTE — TELEPHONE ENCOUNTER
----- Message from Daniel Dougherty sent at 6/15/2020 12:06 PM EDT -----  Regarding: Dr. Hampton [facility]: [phone]  Yumiko Hernandez, is requesting a call back in reference to medical records that were sent over and received.        Copy/paste envera

## 2020-06-15 NOTE — TELEPHONE ENCOUNTER
#[phone] son, Alondra Del Agnel states you should have gotten x ray reports on pt this morning pertaining to wound that they believe is going into the bone. Alondra Del Anegl is asking if Dr. Gautam Scott wants to call in an antibiotic for pt? Please call Alondra Del Angel to go over this with him. Thanks.

## 2020-06-15 NOTE — TELEPHONE ENCOUNTER
Caleb Perazatingham, DO  You 19 minutes ago (2:08 PM)     She should have an MRI of her foot done to get more detailed images to see if there is any infection in her bones.      Routing comment      Chuck  Notified.

## 2020-06-15 NOTE — TELEPHONE ENCOUNTER
Spoke with [employer]. Notified reports have been received and placed in Dr. Lamar Gagnon folder for review. Chuck verbalized understanding of information discussed w/ no further questions at this time.

## 2020-06-16 ENCOUNTER — HOME CARE VISIT (OUTPATIENT)
Dept: SCHEDULING | Facility: HOME HEALTH | Age: 85
End: 2020-06-16
Payer: MEDICARE

## 2020-06-16 PROCEDURE — 3331090002 HH PPS REVENUE DEBIT

## 2020-06-16 PROCEDURE — G0299 HHS/HOSPICE OF RN EA 15 MIN: HCPCS

## 2020-06-16 PROCEDURE — 3331090001 HH PPS REVENUE CREDIT

## 2020-06-17 VITALS
HEART RATE: 84 BPM | DIASTOLIC BLOOD PRESSURE: 60 MMHG | RESPIRATION RATE: 18 BRPM | SYSTOLIC BLOOD PRESSURE: 152 MMHG | TEMPERATURE: 97.9 F | OXYGEN SATURATION: 99 %

## 2020-06-17 PROCEDURE — 3331090001 HH PPS REVENUE CREDIT

## 2020-06-17 PROCEDURE — 3331090002 HH PPS REVENUE DEBIT

## 2020-06-18 PROCEDURE — 3331090002 HH PPS REVENUE DEBIT

## 2020-06-18 PROCEDURE — 3331090001 HH PPS REVENUE CREDIT

## 2020-06-18 RX ORDER — TRAZODONE HYDROCHLORIDE 50 MG/1
TABLET ORAL
Qty: 45 TAB | Refills: 3 | Status: SHIPPED | OUTPATIENT
Start: 2020-06-18

## 2020-06-19 ENCOUNTER — HOME CARE VISIT (OUTPATIENT)
Dept: SCHEDULING | Facility: HOME HEALTH | Age: 85
End: 2020-06-19
Payer: MEDICARE

## 2020-06-19 PROCEDURE — 3331090001 HH PPS REVENUE CREDIT

## 2020-06-19 PROCEDURE — 3331090002 HH PPS REVENUE DEBIT

## 2020-06-19 PROCEDURE — G0299 HHS/HOSPICE OF RN EA 15 MIN: HCPCS

## 2020-06-20 PROCEDURE — 3331090001 HH PPS REVENUE CREDIT

## 2020-06-20 PROCEDURE — 3331090002 HH PPS REVENUE DEBIT

## 2020-06-21 PROCEDURE — 3331090002 HH PPS REVENUE DEBIT

## 2020-06-21 PROCEDURE — 3331090001 HH PPS REVENUE CREDIT

## 2020-06-22 VITALS
DIASTOLIC BLOOD PRESSURE: 60 MMHG | SYSTOLIC BLOOD PRESSURE: 110 MMHG | HEART RATE: 82 BPM | TEMPERATURE: 97.8 F | RESPIRATION RATE: 16 BRPM | OXYGEN SATURATION: 98 %

## 2020-06-22 PROCEDURE — 3331090002 HH PPS REVENUE DEBIT

## 2020-06-22 PROCEDURE — 3331090001 HH PPS REVENUE CREDIT

## 2020-06-23 ENCOUNTER — HOME CARE VISIT (OUTPATIENT)
Dept: SCHEDULING | Facility: HOME HEALTH | Age: 85
End: 2020-06-23
Payer: MEDICARE

## 2020-06-23 ENCOUNTER — HOSPITAL ENCOUNTER (OUTPATIENT)
Dept: WOUND CARE | Age: 85
Discharge: HOME | End: 2020-06-23
Payer: COMMERCIAL

## 2020-06-23 VITALS
RESPIRATION RATE: 18 BRPM | HEART RATE: 88 BPM | TEMPERATURE: 97.3 F | SYSTOLIC BLOOD PRESSURE: 131 MMHG | DIASTOLIC BLOOD PRESSURE: 58 MMHG

## 2020-06-23 VITALS
OXYGEN SATURATION: 98 % | TEMPERATURE: 97.8 F | RESPIRATION RATE: 18 BRPM | DIASTOLIC BLOOD PRESSURE: 72 MMHG | HEART RATE: 73 BPM | SYSTOLIC BLOOD PRESSURE: 118 MMHG

## 2020-06-23 PROCEDURE — 3331090001 HH PPS REVENUE CREDIT

## 2020-06-23 PROCEDURE — 11042 DBRDMT SUBQ TIS 1ST 20SQCM/<: CPT

## 2020-06-23 PROCEDURE — G0300 HHS/HOSPICE OF LPN EA 15 MIN: HCPCS

## 2020-06-23 PROCEDURE — 3331090002 HH PPS REVENUE DEBIT

## 2020-06-23 RX ORDER — AMOXICILLIN AND CLAVULANATE POTASSIUM 500; 125 MG/1; MG/1
1 TABLET, FILM COATED ORAL 2 TIMES DAILY
COMMUNITY
End: 2020-07-07

## 2020-06-23 NOTE — WOUND CARE
06/23/20 1432 Anesthetic Anesthetic 4% Lidocaine Cream  
Peripheral Vascular Peripheral Vascular (WDL) WDL  
RLE Peripheral Vascular Capillary Refill Less than/equal to 3 seconds Color Appropriate for race Temperature Warm Pedal Pulse Palpable Circumference of Calf (cm) 31.5 cm Circumference of Ankle (cm) 25 cm Musculoskeletal  
Musculoskeletal (WDL) WDL Foot Assessment Foot Assessment WDL Wound Foot Right;Medial  
Date First Assessed/Time First Assessed: 06/09/20 1510   Present on Hospital Admission: Yes  Wound Approximate Age at First Assessment (Weeks): 2 weeks  Primary Wound Type: Blister/bullae  Location: Foot  Wound Location Orientation: Right;Medial  
Dressing Status Clean, dry, and intact Dressing Type Dry dressing Non-staged Wound Description Full thickness Wound Length (cm) 1.6 cm Wound Width (cm) 2.4 cm Wound Depth (cm) 0.2 cm Wound Surface Area (cm^2) 3.84 cm^2 Wound Volume (cm^3) 0.77 cm^3 Change in Wound Size % 26.15 Condition of Base Bruin;Slough Drainage Amount Moderate Drainage Color Serosanguinous Wound Odor None Dian-wound Assessment Intact Cleansing and Cleansing Agents  Normal saline Visit Vitals /58 (BP 1 Location: Right arm, BP Patient Position: At rest) Pulse 88 Temp 97.3 °F (36.3 °C) Resp 18

## 2020-06-23 NOTE — DISCHARGE INSTRUCTIONS
Discharge Instructions/Wound Orders  [facility]  [address], [address] [facility]  Telephone: [phone]    NAME:  Sanjiv Farias OF BIRTH:  5/24/1926  MEDICAL RECORD NUMBER:  [id number]  DATE:  6/23/2020    Wound Care Orders:  Right medial foot wound - cleanse with saline, apply Intrasite gel, fill defect with silver alginate, cover with gauze, secure with roll gauze, apply single layer tubigrip size F. Change 3 x week. Return to clinic for MD follow-up in 2 weeks. Dietary:  [x] Diet as tolerated: [] Calorie Diabetic Diet: [x] No Added Salt:  [x] Increase Protein: [] Other:Limit the amount of liquid you are drinking and avoid drinking in between meals   Activity:  [x] Activity as tolerated:  [] Patient has no activity restrictions     [] Strict Bedrest: [] Remain off Work:     [] May return to full duty work:                                   [] Return to work with restrictions:             Return Appointment:  [] Wound and dressing supply provider:   [] ECF or Home Healthcare:  [] Wound Assessment: [] Physician or NP scheduled for Wound Assessment:   [] Return Appointment: With DR Gely Casey   in  2 [facility])  [] Ordered tests:      Electronically signed Erik Smith RN on 6/23/2020 at [address]: Should you experience any significant changes in your wound(s) or have questions about your wound care, please contact the [address] [place] [address] at [address] [place] [address] 8:00 am - 4:30. If you need help with your wound outside these hours and cannot wait until we are again available, contact your PCP or go to the hospital emergency room. PLEASE NOTE: IF YOU ARE UNABLE TO OBTAIN WOUND SUPPLIES, CONTINUE TO USE THE SUPPLIES YOU HAVE AVAILABLE UNTIL YOU ARE ABLE TO REACH US. IT IS MOST IMPORTANT TO KEEP THE WOUND COVERED AT ALL TIMES.      Physician Signature:_______________________    Date: ___________ Time: ____________

## 2020-06-23 NOTE — PROGRESS NOTES
Patient presents to wound clinic for: Red Golden is a 80 y.o. female who presents for initial wound care of the following: medial right first metatarsophalangeal joint wound. Patient is accompanied at today's appointment by her son who helps relate much of her history. Patient has a history of gout with multiple flare-ups as well as a history of chronic kidney disease. Several months ago, she was started on allopurinol and developed an allergic reaction to it and developed a rash all over as a result, which was particularly worse on the extremities. The allopurinol was discontinued. She then had another gout flareup and patient was started on a prednisone dose pack. The recent gout flareups have made it very difficult for her to walk around. Subsequently, she developed a large blood blister in the area of the 1st MPJ and the lesion opened open and began to drain about 1-2 weeks ago. Her PCP has started her on keflex. She has just finished up the prednisone. Home health care has been doing her dressing changes. Denies f/c/n/v/d. Update 6/23/20: Since her last visit, patient and her son both note that the wound continues to improve in appearance, and that her pain in the area has significantly decreased. They did not that since I last saw her, one of the home health care nurses was concerned about the appearance of the wound and contacted [facility]. A wound culture was done and an x-ray was ordered. The wound culture revealed staph, and patient was on Augmentin. Patient was told that nothing was definitively shown on x-ray, but that an MRI would be ordered to definitively rule out a bone infection. Patient and her son both related that at the time they had not noticed any significant changes to the wound and that they had believed it to be improving. There has not been any purulence noticed from the wound. No significant erythema.   Patient's ambulation has been improving since last visit. Pertinent Medical History:  Past Medical History:   Diagnosis Date    Arthritis     GERD (gastroesophageal reflux disease)     Hypertension     Psychiatric disorder     Thyroid disease      History reviewed. No pertinent surgical history. Prior to Admission medications    Medication Sig Start Date End Date Taking? Authorizing Provider   amoxicillin-clavulanate (Augmentin) 500-125 mg per tablet Take 1 Tab by mouth two (2) times a day. Yes Provider, Historical   traZODone (DESYREL) 50 mg tablet TAKE 1/2 TABLET BY MOUTH AT BEDTIME 6/18/20  Yes Job Arroyo III, DO   acetaminophen (TylenoL) 325 mg tablet Take 650 mg by mouth every six (6) hours as needed for Pain. Yes Provider, Historical   levothyroxine (SYNTHROID) 88 mcg tablet TAKE 1 TABLET BY MOUTH EVERY DAY INDICATIONS: HYPOTHYROIDISM 5/31/20  Yes Job Arroyo III, DO   apixaban (Eliquis) 2.5 mg tablet Eliquis 2.5 mg tablet   Take 1 tablet twice a day by oral route. Yes Provider, Historical   amiodarone (CORDARONE) 200 mg tablet TAKE 1 TABLET BY MOUTH TWICE A DAY 2/22/20  Yes Provider, Historical   atorvastatin (LIPITOR) 10 mg tablet TAKE 1 TABLET BY MOUTH EVERY OTHER DAY 3/8/20  Yes Job Arroyo III, DO   vit C/E/Zn/coppr/lutein/zeaxan (PRESERVISION AREDS-2 PO) Take  by mouth daily.    Yes Provider, Historical   lansoprazole (PREVACID) 30 mg capsule TAKE 1 CAPSULE BY MOUTH EVERY DAY IN THE MORNING 1/29/20  Yes Job Arroyo III, DO   escitalopram oxalate (LEXAPRO) 10 mg tablet TAKE 1 TABLET BY MOUTH EVERY DAY 1/19/20  Yes Job Arroyo III, DO   carvedilol (COREG) 3.125 mg tablet TAKE 1 TABLET BY MOUTH TWICE A DAY 11/19/18  Yes Provider, Historical   losartan (COZAAR) 100 mg tablet TAKE 1 TABLET EVERY DAY 12/5/18  Yes Provider, Historical   torsemide (DEMADEX) 20 mg tablet TAKE 1 TABLET BY MOUTH EVERY DAY 4/14/20   Provider, Historical     Allergies   Allergen Reactions    Bextra [Valdecoxib] Hives No longer allergic     Family History   Problem Relation Age of Onset    Hypertension Mother     Heart Disease Mother     Cancer Maternal Aunt         breast     Social History     Socioeconomic History    Marital status:      Spouse name: Not on file    Number of children: Not on file    Years of education: Not on file    Highest education level: Not on file   Occupational History    Not on file   Social Needs    Financial resource strain: Not on file    Food insecurity     Worry: Not on file     Inability: Not on file    Transportation needs     Medical: Not on file     Non-medical: Not on file   Tobacco Use    Smoking status: Never Smoker    Smokeless tobacco: Never Used   Substance and Sexual Activity    Alcohol use: Yes     Alcohol/week: 1.0 standard drinks     Types: 1 Glasses of wine per week     Comment: occasional    Drug use: Yes     Types: Prescription    Sexual activity: Not Currently   Lifestyle    Physical activity     Days per week: Not on file     Minutes per session: Not on file    Stress: Not on file   Relationships    Social connections     Talks on phone: Not on file     Gets together: Not on file     Attends Taoist service: Not on file     Active member of club or organization: Not on file     Attends meetings of clubs or organizations: Not on file     Relationship status: Not on file    Intimate partner violence     Fear of current or ex partner: Not on file     Emotionally abused: Not on file     Physically abused: Not on file     Forced sexual activity: Not on file   Other Topics Concern    Not on file   Social History Narrative    Not on file       Vitals:    06/23/20 1421   BP: 131/58   Pulse: 88   Resp: 18   Temp: 97.3 °F (36.3 °C)       Review of Systems:    Gen: No fever, chills, malaise, weight loss/gain. Heent: No headache, rhinorrhea, epistaxis, ear pain, hearing loss, sinus pain, neck pain/stiffness, sore throat.    Heart: No chest pain, palpitations, ARTEAGA, pnd, or orthopnea. Resp: No cough, hemoptysis, wheezing and shortness of breath. GI: No nausea, vomiting, diarrhea, constipation, melena or hematochezia. : No urinary obstruction, dysuria or hematuria. Derm: see below  Musc/skeletal: no bone or joint complains. Vasc: No edema, cyanosis or claudication. Endo: No heat/cold intolerance, no polyuria,polydipsia or polyphagia. Neuro: No unilateral weakness, numbness, tingling. No seizures. Heme: No easy bruising or bleeding. Wound Description:   Wound Type: Blister/Gout   Indication: Acute <30days   Status: improving   Measurements:    Wound Length:  1.6      Wound Width : 2.4      Wound Depth : 0.2   Tissue Type:   Epithelial: without necrosis  Granulation: without necrosis  Subcutaneous: without necrosis  Slough: yes  Eschar: NA  Tendon: without necrosis  Fascia: without necrosis  Muscle: without necrosis  Bone: without necrosis   Drainage: minimal serosanguineous drainage from wound bed, some synovial joint fluid noted at the level of the MPJ; a small amount of gouty tophi noted to wound bed--significantly decreased from last visit    Debridement: required today to promote wound healing    Character of Ulcer: Improving    Indication for Debridement: Slough, Exudate, Abnormal wound edge and Abnormal Wound base     Pre debridement measurements: 1.6 cm x 2.4  cm x 0.2 cm    Risks of procedure were discussed with patient. Consent has been signed.      Anesthetic: Lidocaine 4% topical cream     Level of Debridement: Subctaneous Tissue , Muscle, Bone     Tissue and/or Material removed: Bone, Exudate, Fibrin/ Slough, Ligament, Skin and Subcutaneous    Type of Tissue: Viable      Pre-debridement severity: Necrosis of Muscle     Post debridement severity: Necrosis of Bone     Instrument(s) used: Scalpel, Curette and Ronguer    Bleeding controlled with: Pressure and Silver Nitrate    Estimated blood loss: less than 50 ml    Pre debridement measurements: 1.7 cm x 2.5 cm x 0.3 cm    Post procedural pain: mild    Patient tolerated procedure well. Infection:no     Edema:NA    Lower Extremity Circulation   Palpable pulses    Assessment:   1. Ulcer of right medial 1st MPJ with exposed bone  2. Gout    Plan:   -Patient seen and assessed. Regarding the etiology of the wound, I discussed with patient and son that it appears that the wound is a result of buildup of excessive gouty tophi in the 1st MPJ. I explained that many times gouty tophi builds up in the joint over the years and can cause excessive thinning of the joint capsule as well and degenerative changes of the joint. When the patient happened to have the latest gout attack at the same time as the allergic reaction to the allopurinol on top of the thinning skin due to the advanced age, her skin brok down and the wound formed. Unforunately around this time, the joint capsule of the 1st MPJ deteriorated as well and gouty tophi began to spill out.  -In regards to the MRI, I discussed with the patient and her son that my recommendation wound be to hold off on the test at this time. [facility]. We discussed that as  I had debrided part of the bone and the joint capsule to remove part of the gouty tophi at the last visit 2 weeks ago that this would lead to a false positive on the MRI debriding at the level of the bone/joint would cause changes that could be easily mistaken for osteomyelitis, and that either way her MRI would end up having a positive result at this time for reactive edema/possible osteo whether or not she truly had osteo. We discussed that the treatment option for osteomyelitis in light of the patient's CKD would be amputation as she would not be a good candidate for long-term IV antibiotics especially with the location in the foot.   Additionally, if it were an osteomyelitis, the patient would not have had the significant reduction in size, pain, and swelling that she has had since her last appointment two weeks ago. We discussed that at this time the wound does not appear clinically infected as there is no significant swelling, erythema, and edema. As the patient is finishing her Augmentin, I recommend continuing to monitor it and see how she continues to improve. If her progress stalls at some point in the future, then we can proceed with the MRI, or we may discuss getting a bone biopsy from the metatarsal head as well as check ESR/CRP. Patient and son are in agreement with holding off on the MRI as they are in agreement that the wound is improving and would like to avoid a false positive MRI. -Wound debrided as noted above. The wound bed was debrided and some gouty tophi was removed. Fortunately wound bed was free of infection at this time.   Bone appears hard and intact without any degenerative changes.   -Intrasite gel, aquacel, gauze.  -Follow-up 2 weeks

## 2020-06-24 ENCOUNTER — HOME CARE VISIT (OUTPATIENT)
Dept: HOME HEALTH SERVICES | Facility: HOME HEALTH | Age: 85
End: 2020-06-24
Payer: MEDICARE

## 2020-06-24 PROCEDURE — 3331090002 HH PPS REVENUE DEBIT

## 2020-06-24 PROCEDURE — 3331090001 HH PPS REVENUE CREDIT

## 2020-06-25 PROCEDURE — 3331090002 HH PPS REVENUE DEBIT

## 2020-06-25 PROCEDURE — 3331090001 HH PPS REVENUE CREDIT

## 2020-06-26 ENCOUNTER — HOME CARE VISIT (OUTPATIENT)
Dept: SCHEDULING | Facility: HOME HEALTH | Age: 85
End: 2020-06-26
Payer: MEDICARE

## 2020-06-26 PROCEDURE — 3331090002 HH PPS REVENUE DEBIT

## 2020-06-26 PROCEDURE — G0299 HHS/HOSPICE OF RN EA 15 MIN: HCPCS

## 2020-06-26 PROCEDURE — 3331090001 HH PPS REVENUE CREDIT

## 2020-06-27 PROCEDURE — 3331090002 HH PPS REVENUE DEBIT

## 2020-06-27 PROCEDURE — 3331090001 HH PPS REVENUE CREDIT

## 2020-06-28 VITALS
DIASTOLIC BLOOD PRESSURE: 52 MMHG | HEART RATE: 85 BPM | RESPIRATION RATE: 18 BRPM | TEMPERATURE: 98.5 F | OXYGEN SATURATION: 97 % | SYSTOLIC BLOOD PRESSURE: 108 MMHG

## 2020-06-28 PROCEDURE — 3331090001 HH PPS REVENUE CREDIT

## 2020-06-28 PROCEDURE — 3331090002 HH PPS REVENUE DEBIT

## 2020-06-29 PROCEDURE — 3331090001 HH PPS REVENUE CREDIT

## 2020-06-29 PROCEDURE — 3331090002 HH PPS REVENUE DEBIT

## 2020-06-30 ENCOUNTER — TELEPHONE (OUTPATIENT)
Dept: INTERNAL MEDICINE CLINIC | Age: 85
End: 2020-06-30

## 2020-06-30 ENCOUNTER — HOME CARE VISIT (OUTPATIENT)
Dept: SCHEDULING | Facility: HOME HEALTH | Age: 85
End: 2020-06-30
Payer: MEDICARE

## 2020-06-30 VITALS
RESPIRATION RATE: 16 BRPM | TEMPERATURE: 97.9 F | DIASTOLIC BLOOD PRESSURE: 54 MMHG | HEART RATE: 66 BPM | SYSTOLIC BLOOD PRESSURE: 98 MMHG | OXYGEN SATURATION: 98 % | WEIGHT: 125 LBS | BODY MASS INDEX: 23.62 KG/M2

## 2020-06-30 PROCEDURE — 3331090001 HH PPS REVENUE CREDIT

## 2020-06-30 PROCEDURE — G0299 HHS/HOSPICE OF RN EA 15 MIN: HCPCS

## 2020-06-30 PROCEDURE — 3331090002 HH PPS REVENUE DEBIT

## 2020-07-01 PROCEDURE — 3331090002 HH PPS REVENUE DEBIT

## 2020-07-01 PROCEDURE — A6446 CONFORM BAND S W>=3" <5"/YD: HCPCS

## 2020-07-01 PROCEDURE — A4452 WATERPROOF TAPE: HCPCS

## 2020-07-01 PROCEDURE — A6260 WOUND CLEANSER ANY TYPE/SIZE: HCPCS

## 2020-07-01 PROCEDURE — A6216 NON-STERILE GAUZE<=16 SQ IN: HCPCS

## 2020-07-01 PROCEDURE — 3331090001 HH PPS REVENUE CREDIT

## 2020-07-02 ENCOUNTER — HOME CARE VISIT (OUTPATIENT)
Dept: HOME HEALTH SERVICES | Facility: HOME HEALTH | Age: 85
End: 2020-07-02
Payer: MEDICARE

## 2020-07-02 PROCEDURE — 3331090002 HH PPS REVENUE DEBIT

## 2020-07-02 PROCEDURE — 3331090001 HH PPS REVENUE CREDIT

## 2020-07-03 PROCEDURE — 3331090002 HH PPS REVENUE DEBIT

## 2020-07-03 PROCEDURE — 3331090001 HH PPS REVENUE CREDIT

## 2020-07-04 PROCEDURE — 3331090001 HH PPS REVENUE CREDIT

## 2020-07-04 PROCEDURE — 3331090002 HH PPS REVENUE DEBIT

## 2020-07-05 PROCEDURE — 3331090001 HH PPS REVENUE CREDIT

## 2020-07-05 PROCEDURE — 3331090002 HH PPS REVENUE DEBIT

## 2020-07-06 PROCEDURE — 3331090001 HH PPS REVENUE CREDIT

## 2020-07-06 PROCEDURE — 3331090002 HH PPS REVENUE DEBIT

## 2020-07-07 ENCOUNTER — HOME CARE VISIT (OUTPATIENT)
Dept: HOME HEALTH SERVICES | Facility: HOME HEALTH | Age: 85
End: 2020-07-07
Payer: MEDICARE

## 2020-07-07 ENCOUNTER — HOME CARE VISIT (OUTPATIENT)
Dept: SCHEDULING | Facility: HOME HEALTH | Age: 85
End: 2020-07-07
Payer: MEDICARE

## 2020-07-07 ENCOUNTER — HOSPITAL ENCOUNTER (OUTPATIENT)
Dept: WOUND CARE | Age: 85
Discharge: HOME | End: 2020-07-07
Admitting: PODIATRIST
Payer: COMMERCIAL

## 2020-07-07 VITALS
RESPIRATION RATE: 18 BRPM | HEART RATE: 83 BPM | SYSTOLIC BLOOD PRESSURE: 111 MMHG | DIASTOLIC BLOOD PRESSURE: 57 MMHG | TEMPERATURE: 97.8 F

## 2020-07-07 DIAGNOSIS — M1A.3711 CHRONIC GOUT OF RIGHT FOOT DUE TO RENAL IMPAIRMENT WITH TOPHUS: Primary | ICD-10-CM

## 2020-07-07 PROBLEM — M10.371 ACUTE GOUT DUE TO RENAL IMPAIRMENT INVOLVING RIGHT FOOT: Status: ACTIVE | Noted: 2020-07-07

## 2020-07-07 PROCEDURE — 3331090002 HH PPS REVENUE DEBIT

## 2020-07-07 PROCEDURE — 11042 DBRDMT SUBQ TIS 1ST 20SQCM/<: CPT

## 2020-07-07 PROCEDURE — 3331090001 HH PPS REVENUE CREDIT

## 2020-07-07 RX ORDER — LIDOCAINE HYDROCHLORIDE 10 MG/ML
3 INJECTION INFILTRATION; PERINEURAL ONCE
Status: COMPLETED | OUTPATIENT
Start: 2020-07-07 | End: 2020-07-07

## 2020-07-07 RX ORDER — AMOXICILLIN AND CLAVULANATE POTASSIUM 500; 125 MG/1; MG/1
1 TABLET, FILM COATED ORAL 2 TIMES DAILY
Qty: 20 TAB | Refills: 0 | Status: SHIPPED | OUTPATIENT
Start: 2020-07-07 | End: 2020-07-14

## 2020-07-07 RX ORDER — HYDROCODONE BITARTRATE AND ACETAMINOPHEN 5; 325 MG/1; MG/1
1 TABLET ORAL
Qty: 28 TAB | Refills: 0 | Status: SHIPPED | OUTPATIENT
Start: 2020-07-07 | End: 2020-07-14

## 2020-07-07 RX ADMIN — LIDOCAINE HYDROCHLORIDE 3 ML: 10 INJECTION INFILTRATION; PERINEURAL at 15:48

## 2020-07-07 NOTE — WOUND CARE
07/07/20 1605 Wound Foot Right;Medial  
Date First Assessed/Time First Assessed: 06/09/20 1510   Present on Hospital Admission: Yes  Wound Approximate Age at First Assessment (Weeks): 2 weeks  Primary Wound Type: Blister/bullae  Location: Foot  Wound Location Orientation: Right;Medial  
Dressing Changed Changed/New Dressing Type Applied Alginate;Gauze wrap (jason) (intrasite, tubi ) Wound Foot Dorsal  
Date First Assessed/Time First Assessed: 07/07/20 1607   Primary Wound Type: Blister/bullae  Location: Foot  Wound Location Orientation: Dorsal  
Wound Length (cm) 1.5 cm Wound Width (cm) 0.2 cm Wound Depth (cm) 1.5 cm Wound Surface Area (cm^2) 0.3 cm^2 Wound Volume (cm^3) 0.45 cm^3 Condition of Base Pink Dressing Changed Changed/New Dressing Type Applied Packing;Gauze wrap (jason) 
(tubi ) Discharge Condition: Stable Pain: 0 Ambulatory Status: Walking Discharge Destination: Home Transportation: Car Accompanied by: Self  and Family/Caregiver Discharge instructions reviewed with Patient and Family/Caregiver  and copy or written instructions have been provided. All questions/concerns have been addressed at this time.

## 2020-07-07 NOTE — WOUND CARE
07/07/20 1504 Wound Foot Right;Medial  
Date First Assessed/Time First Assessed: 06/09/20 1510   Present on Hospital Admission: Yes  Wound Approximate Age at First Assessment (Weeks): 2 weeks  Primary Wound Type: Blister/bullae  Location: Foot  Wound Location Orientation: Right;Medial  
Dressing Status Removed Dressing Type Gauze wrap (jason); Aquacel Wound Length (cm) 1.4 cm Wound Width (cm) 2.2 cm Wound Depth (cm) 0.2 cm Wound Surface Area (cm^2) 3.08 cm^2 Wound Volume (cm^3) 0.62 cm^3 Change in Wound Size % 40.77 Condition of [facility] Condition of Edges Open Drainage Amount Small Drainage Color Serous Wound Odor None Dian-wound Assessment Intact; Pink Cleansing and Cleansing Agents  Normal saline Visit Vitals /57 (BP 1 Location: Left arm, BP Patient Position: Sitting) Pulse 83 Temp 97.8 °F (36.6 °C) Resp 18

## 2020-07-07 NOTE — DISCHARGE INSTRUCTIONS
Discharge Instructions for  [facility]  [address], [address] [facility]  Telephone: [phone]    NAME:  Kerri Rice OF BIRTH:  5/24/1926  MEDICAL RECORD NUMBER:  [id number]  DATE:  7/7/2020      WOUND CARE ORDERS:  Right medial foot wound - cleanse with saline, apply Intrasite gel, fill defect with silver alginate, cover with gauze, secure with roll gauze, Change 3 x week. Right dorsal foot: Pack the wound with 1/4 \" packing gauze, cover with gauze roll gauze. Change daily. apply single layer tubigrip size F. Return to clinic for MD follow-up in  weeks. TREATMENT ORDERS:    Elevate leg(s) above the level of the heart when sitting. Avoid prolonged standing in one place. Do no get dressing/wrap wet. Follow Diet as prescribed:   [x] Diet as tolerated: [] Calorie Diabetic Diet: [x] No Added Salt:  [] Increase Protein: [] Limit the amount of liquid you are drinking and avoid drinking in between meals               Return Appointment:  [x] Return Appointment: With DR Stefani Boykin   in  [facility])  [] Nurse Visit : *** days  [] Ordered tests:      Electronically signed Eder Clement RN on 7/7/2020 at 3:59 PM     Carrie Byrnes 281: Should you experience any significant changes in your wound(s) or have questions about your wound care, please contact the [address] [place] [address] at [address] [place] [address] 8:00 am - 4:30. If you need help with your wound outside these hours and cannot wait until we are again available, contact your PCP or go to the hospital emergency room. PLEASE NOTE: IF YOU ARE UNABLE TO OBTAIN WOUND SUPPLIES, CONTINUE TO USE THE SUPPLIES YOU HAVE AVAILABLE UNTIL YOU ARE ABLE TO REACH US. IT IS MOST IMPORTANT TO KEEP THE WOUND COVERED AT ALL TIMES.      Physician Signature:_______________________    Date: ___________ Time:  ____________

## 2020-07-07 NOTE — PROGRESS NOTES
Patient presents to wound clinic for: Ki Fan is a 80 y.o. female who presents for initial wound care of the following: medial right first metatarsophalangeal joint wound. Patient is accompanied at today's appointment by her son who helps relate much of her history. Patient has a history of gout with multiple flare-ups as well as a history of chronic kidney disease. Several months ago, she was started on allopurinol and developed an allergic reaction to it and developed a rash all over as a result, which was particularly worse on the extremities. The allopurinol was discontinued. She then had another gout flareup and patient was started on a prednisone dose pack. The recent gout flareups have made it very difficult for her to walk around. Subsequently, she developed a large blood blister in the area of the 1st MPJ and the lesion opened open and began to drain about 1-2 weeks ago. Home health care has been doing her dressing changes. She had another rheumatology appointment earlier today. She has also noticed that she had another area along the lateral aspect of the 1st MPJ/1st intermetatarsal space that is swelling and becoming painful. Denies f/c/n/v/d. Pertinent Medical History:  Past Medical History:   Diagnosis Date    Arthritis     GERD (gastroesophageal reflux disease)     Hypertension     Psychiatric disorder     Thyroid disease      No past surgical history on file. Prior to Admission medications    Medication Sig Start Date End Date Taking? Authorizing Provider   amoxicillin-clavulanate (AUGMENTIN) 500-125 mg per tablet Take 1 Tab by mouth two (2) times a day for 10 days. Indications: an infection of the skin and the tissue below the skin 7/7/20 7/17/20 Yes Mery Morirs DPM   HYDROcodone-acetaminophen [facility]) 5-325 mg per tablet Take 1 Tab by mouth every six (6) hours as needed for Pain for up to 7 days. Max Daily Amount: 4 Tabs.  7/7/20 7/14/20 Yes Lance Medina DPM   traZODone (DESYREL) 50 mg tablet TAKE 1/2 TABLET BY MOUTH AT BEDTIME 6/18/20   Raji WILSON III, DO   acetaminophen (TylenoL) 325 mg tablet Take 650 mg by mouth every six (6) hours as needed for Pain. Provider, Historical   levothyroxine (SYNTHROID) 88 mcg tablet TAKE 1 TABLET BY MOUTH EVERY DAY INDICATIONS: HYPOTHYROIDISM 5/31/20   Raji WILSON III, DO   apixaban (Eliquis) 2.5 mg tablet Eliquis 2.5 mg tablet   Take 1 tablet twice a day by oral route. Provider, Historical   amiodarone (CORDARONE) 200 mg tablet TAKE 1 TABLET BY MOUTH TWICE A DAY 2/22/20   Provider, Historical   torsemide (DEMADEX) 20 mg tablet TAKE 1 TABLET BY MOUTH EVERY DAY 4/14/20   Provider, Historical   atorvastatin (LIPITOR) 10 mg tablet TAKE 1 TABLET BY MOUTH EVERY OTHER DAY 3/8/20   Raji WILSON III, DO   vit C/E/Zn/coppr/lutein/zeaxan (PRESERVISION AREDS-2 PO) Take  by mouth daily.     Provider, Historical   lansoprazole (PREVACID) 30 mg capsule TAKE 1 CAPSULE BY MOUTH EVERY DAY IN THE MORNING 1/29/20   Raji WILSON III, DO   escitalopram oxalate (LEXAPRO) 10 mg tablet TAKE 1 TABLET BY MOUTH EVERY DAY 1/19/20   Job Arroyo III, DO   carvedilol (COREG) 3.125 mg tablet TAKE 1 TABLET BY MOUTH TWICE A DAY 11/19/18   Provider, Historical   losartan (COZAAR) 100 mg tablet TAKE 1 TABLET EVERY DAY 12/5/18   Provider, Historical     Allergies   Allergen Reactions    Bextra [Valdecoxib] Hives     No longer allergic     Family History   Problem Relation Age of Onset    Hypertension Mother     Heart Disease Mother     Cancer Maternal Aunt         breast     Social History     Socioeconomic History    Marital status:      Spouse name: Not on file    Number of children: Not on file    Years of education: Not on file    Highest education level: Not on file   Occupational History    Not on file   Social Needs    Financial resource strain: Not on file    Food insecurity     Worry: Not on file Inability: Not on file    Transportation needs     Medical: Not on file     Non-medical: Not on file   Tobacco Use    Smoking status: Never Smoker    Smokeless tobacco: Never Used   Substance and Sexual Activity    Alcohol use: Yes     Alcohol/week: 1.0 standard drinks     Types: 1 Glasses of wine per week     Comment: occasional    Drug use: Yes     Types: Prescription    Sexual activity: Not Currently   Lifestyle    Physical activity     Days per week: Not on file     Minutes per session: Not on file    Stress: Not on file   Relationships    Social connections     Talks on phone: Not on file     Gets together: Not on file     Attends Temple service: Not on file     Active member of club or organization: Not on file     Attends meetings of clubs or organizations: Not on file     Relationship status: Not on file    Intimate partner violence     Fear of current or ex partner: Not on file     Emotionally abused: Not on file     Physically abused: Not on file     Forced sexual activity: Not on file   Other Topics Concern    Not on file   Social History Narrative    Not on file       Vitals:    07/07/20 1504   BP: 111/57   Pulse: 83   Resp: 18   Temp: 97.8 °F (36.6 °C)       Review of Systems:    Gen: No fever, chills, malaise, weight loss/gain. Heent: No headache, rhinorrhea, epistaxis, ear pain, hearing loss, sinus pain, neck pain/stiffness, sore throat. Heart: No chest pain, palpitations, ARTEAGA, pnd, or orthopnea. Resp: No cough, hemoptysis, wheezing and shortness of breath. GI: No nausea, vomiting, diarrhea, constipation, melena or hematochezia. : No urinary obstruction, dysuria or hematuria. Derm: see below  Musc/skeletal: no bone or joint complains. Vasc: No edema, cyanosis or claudication. Endo: No heat/cold intolerance, no polyuria,polydipsia or polyphagia. Neuro: No unilateral weakness, numbness, tingling. No seizures. Heme: No easy bruising or bleeding.     Wound Description:   Wound Type: Blister/Gout   Indication: Acute <30days   Status: improving   Measurements:    Wound Length:  1.4      Wound Width : 2.2      Wound Depth : 0.2   Tissue Type:   Epithelial: without necrosis  Granulation: without necrosis  Subcutaneous: without necrosis  Slough: yes  Eschar: NA  Tendon: without necrosis  Fascia: without necrosis  Muscle: without necrosis  Bone: without necrosis   Drainage: minimal serosanguineous drainage from wound bed, ; a small amount of gouty tophi noted to wound bed--significantly decreased from last visit    Additional to the dorsal lateral 1st MPJ/dorsal 1st metatarsal space there is a fluctuant area that is not red, hot and swollen. Area is tender to touch. Debridement: required today to promote wound healing    Character of Ulcer: Improving    Indication for Debridement: Slough, Exudate, Abnormal wound edge and Abnormal Wound base     Pre debridement measurements: 1.4 cm x 2.2 cm x 0.2 cm    Risks of procedure were discussed with patient. Consent has been signed. Anesthetic: Lidocaine 4% topical cream     Level of Debridement: Subctaneous Tissue , Muscle, Bone     Tissue and/or Material removed: Bone, Exudate, Fibrin/ Slough, Ligament, Skin and Subcutaneous    Type of Tissue: Viable      Pre-debridement severity: Necrosis of Muscle     Post debridement severity: Necrosis of Bone     Instrument(s) used: Scalpel, Curette and Ronguer    Bleeding controlled with: Pressure and Silver Nitrate    Estimated blood loss: less than 50 ml    Pre debridement measurements: 1.5 cm x 2.3 cm x 0.3 cm    Post procedural pain: mild    Patient tolerated procedure well. Procedure note: The skin overlying the fluctuant area of the 1st MPJ of the right foot was numbed with 1% lidocaine plain (10 mL). Then an #15 blade was utilized to make a small 2 cm incision. A copious amount of serous drainage and gouty tophi was noted. Patient noted relief with drainage.   The fluid was culture and some of the tophi was sent of for pathology. The area was explored and noted to probe down to the joint of the 1st MPJ. The area was then thoroughly irrigated. Infection:no     Edema:NA    Lower Extremity Circulation   Palpable pulses    Assessment:   1. Ulcer of right medial 1st MPJ with exposed bone  2. Gout    Plan:   -Arthrotomy/I&D of the tophaceous joint as noted above. -Medial 1st MPJ wound debrided as noted above. The wound bed was debrided and some gouty tophi was removed. Fortunately wound bed was free of infection at this time. Bone appears hard and intact without any degenerative changes.   -Intrasite gel, aquacel, gauze to the medial wound.   Packing to the dorsal wound.  -Augmentin as prophylactic antibiotic while awaiting cultures  -Norco for pain  -Follow-up 1 week

## 2020-07-08 PROCEDURE — 3331090001 HH PPS REVENUE CREDIT

## 2020-07-08 PROCEDURE — 3331090002 HH PPS REVENUE DEBIT

## 2020-07-09 ENCOUNTER — HOME CARE VISIT (OUTPATIENT)
Dept: SCHEDULING | Facility: HOME HEALTH | Age: 85
End: 2020-07-09
Payer: MEDICARE

## 2020-07-09 VITALS
HEART RATE: 84 BPM | DIASTOLIC BLOOD PRESSURE: 62 MMHG | SYSTOLIC BLOOD PRESSURE: 115 MMHG | OXYGEN SATURATION: 96 % | TEMPERATURE: 98.8 F

## 2020-07-09 PROCEDURE — A6252 ABSORPT DRG >16 <=48 W/O BDR: HCPCS

## 2020-07-09 PROCEDURE — 3331090001 HH PPS REVENUE CREDIT

## 2020-07-09 PROCEDURE — A6197 ALGINATE DRSG >16 <=48 SQ IN: HCPCS

## 2020-07-09 PROCEDURE — 400013 HH SOC

## 2020-07-09 PROCEDURE — 3331090002 HH PPS REVENUE DEBIT

## 2020-07-09 PROCEDURE — G0299 HHS/HOSPICE OF RN EA 15 MIN: HCPCS

## 2020-07-10 LAB — BACTERIA SPEC AEROBE CULT: NORMAL

## 2020-07-10 PROCEDURE — 3331090001 HH PPS REVENUE CREDIT

## 2020-07-10 PROCEDURE — 3331090002 HH PPS REVENUE DEBIT

## 2020-07-11 PROCEDURE — 3331090001 HH PPS REVENUE CREDIT

## 2020-07-11 PROCEDURE — 3331090002 HH PPS REVENUE DEBIT

## 2020-07-12 PROCEDURE — 3331090002 HH PPS REVENUE DEBIT

## 2020-07-12 PROCEDURE — 3331090001 HH PPS REVENUE CREDIT

## 2020-07-13 PROCEDURE — 3331090002 HH PPS REVENUE DEBIT

## 2020-07-13 PROCEDURE — 3331090001 HH PPS REVENUE CREDIT

## 2020-07-14 ENCOUNTER — HOME CARE VISIT (OUTPATIENT)
Dept: HOME HEALTH SERVICES | Facility: HOME HEALTH | Age: 85
End: 2020-07-14
Payer: MEDICARE

## 2020-07-14 ENCOUNTER — HOSPITAL ENCOUNTER (OUTPATIENT)
Dept: WOUND CARE | Age: 85
Discharge: HOME | End: 2020-07-14
Payer: COMMERCIAL

## 2020-07-14 VITALS
TEMPERATURE: 97.5 F | HEART RATE: 89 BPM | RESPIRATION RATE: 18 BRPM | DIASTOLIC BLOOD PRESSURE: 88 MMHG | SYSTOLIC BLOOD PRESSURE: 118 MMHG

## 2020-07-14 PROCEDURE — 3331090002 HH PPS REVENUE DEBIT

## 2020-07-14 PROCEDURE — 11042 DBRDMT SUBQ TIS 1ST 20SQCM/<: CPT

## 2020-07-14 PROCEDURE — 3331090001 HH PPS REVENUE CREDIT

## 2020-07-14 NOTE — WOUND CARE
07/14/20 1341   Wound Foot Dorsal # 2   Date First Assessed/Time First Assessed: 07/07/20 1607   Primary Wound Type: Blister/bullae  Location: Foot  Wound Location Orientation: Dorsal  Wound Description: # 2   Dressing Status Removed   Dressing Type Gauze;Gauze wrap (jason)   Wound Length (cm) 1.5 cm   Wound Width (cm) 0.7 cm   Wound Depth (cm) 0.1 cm   Wound Surface Area (cm^2) 1.05 cm^2   Wound Volume (cm^3) 0.1 cm^3   Condition of Base Granulation   Condition of Edges Open   Drainage Amount Moderate   Drainage Color Serosanguinous   Wound Odor None   Dian-wound Assessment Swelling   Cleansing and Cleansing Agents  Normal saline   Wound Foot Right;Medial # 1   Date First Assessed/Time First Assessed: 06/09/20 1510   Present on Hospital Admission: Yes  Wound Approximate Age at First Assessment (Weeks): 2 weeks  Primary Wound Type: Blister/bullae  Location: Foot  Wound Location Orientation: Right;Medial  Woun. ..    Dressing Status Removed   Dressing Type Aquacel;Gauze;Gauze wrap (jason)   Wound Length (cm) 1.5 cm   Wound Width (cm) 2 cm   Wound Depth (cm) 0.1 cm   Wound Surface Area (cm^2) 3 cm^2   Wound Volume (cm^3) 0.3 cm^3   Change in Wound Size % 42.31   Condition of Base Slough   Condition of Edges Open   Drainage Amount Moderate   Drainage Color Serous   Wound Odor None   Dian-wound Assessment Swelling   Cleansing and Cleansing Agents  Normal saline     Visit Vitals  /88   Pulse 89   Temp 97.5 °F (36.4 °C)   Resp 18        RLE Peripheral Vascular   Capillary Refill: Less than/equal to 3 seconds (07/14/20 1333)  Color: Appropriate for race (07/14/20 1333)  Temperature: Warm (07/14/20 1333)  Pedal Pulse: Palpable (07/14/20 1333)  Circumference of Calf (cm): 32 cm (07/14/20 1333)  Location of Measurement (Calf): Mid  (07/14/20 1333)  Circumference of Ankle (cm): 25.5 cm (07/14/20 1333)  Location of Measurement (Ankle): Upper  (07/14/20 1333)

## 2020-07-14 NOTE — DISCHARGE INSTRUCTIONS
Discharge Instructions/Wound Orders  [facility]  [address], [address] [facility]  Telephone: [phone]    NAME:  Adrienne Ledesma OF BIRTH:  5/24/1926  MEDICAL RECORD NUMBER:  [id number]  DATE:  7/14/2020    Wound Care Orders:    Right medial foot wound & right dorsal foot wound- cleanse with saline, apply Intrasite gel, fill defect with silver alginate, cover with gauze, secure with roll gauze, Change 3 x week. Return to clinic in 2 weeks for MD follow-up. Dietary:  [x] Diet as tolerated: [] Calorie Diabetic Diet: [] No Added Salt:  [x] Increase Protein: [] Other:Limit the amount of liquid you are drinking and avoid drinking in between meals   Activity:  [x] Activity as tolerated:  [] Patient has no activity restrictions     [] Strict Bedrest: [] Remain off Work:     [] May return to full duty work:                                   [] Return to work with restrictions:             Return Appointment:  [] Wound and dressing supply provider:   [] ECF or Home Healthcare:  [] Wound Assessment: [] Physician or NP scheduled for Wound Assessment:   [x] Return Appointment: With DR Dilia Donald   in  [facility])  [] Ordered tests:      Electronically signed Willy Hicks RN on 7/14/2020 at 2:00 PM     Carrie Byrnes 281: Should you experience any significant changes in your wound(s) or have questions about your wound care, please contact the [address] [place] [address] at [address] [place] [address] 8:00 am - 4:30. If you need help with your wound outside these hours and cannot wait until we are again available, contact your PCP or go to the hospital emergency room. PLEASE NOTE: IF YOU ARE UNABLE TO OBTAIN WOUND SUPPLIES, CONTINUE TO USE THE SUPPLIES YOU HAVE AVAILABLE UNTIL YOU ARE ABLE TO REACH US. IT IS MOST IMPORTANT TO KEEP THE WOUND COVERED AT ALL TIMES.      Physician Signature:_______________________    Date: ___________ Time: ____________

## 2020-07-14 NOTE — PROGRESS NOTES
Patient presents to wound clinic for: Liya Funez is a 80 y.o. female who presents for initial wound care of the following: medial right first metatarsophalangeal joint wound. Patient is accompanied at today's appointment by her son who helps relate much of her history. Patient has a history of gout with multiple flare-ups as well as a history of chronic kidney disease. Several months ago, she was started on allopurinol and developed an allergic reaction to it and developed a rash all over as a result, which was particularly worse on the extremities. The allopurinol was discontinued. She then had another gout flareup and patient was started on a prednisone dose pack. Home health care has been doing her dressing changes. She notes that she had a reaction to the Augmentin and discontinued it after several days. She notes that the wounds have been improving and that her foot has been feeling much better since the fluid was drained from the MPJ last week. Denies f/c/n/v/d. Pertinent Medical History:  Past Medical History:   Diagnosis Date    Arthritis     GERD (gastroesophageal reflux disease)     Hypertension     Psychiatric disorder     Thyroid disease      History reviewed. No pertinent surgical history. Prior to Admission medications    Medication Sig Start Date End Date Taking? Authorizing Provider   acetaminophen (TylenoL) 325 mg tablet Take 650 mg by mouth every six (6) hours as needed for Pain. Yes Provider, Historical   levothyroxine (SYNTHROID) 88 mcg tablet TAKE 1 TABLET BY MOUTH EVERY DAY INDICATIONS: HYPOTHYROIDISM 5/31/20  Yes Job Arroyo III, DO   apixaban (Eliquis) 2.5 mg tablet Eliquis 2.5 mg tablet   Take 1 tablet twice a day by oral route.    Yes Provider, Historical   amiodarone (CORDARONE) 200 mg tablet TAKE 1 TABLET BY MOUTH TWICE A DAY 2/22/20  Yes Provider, Historical   atorvastatin (LIPITOR) 10 mg tablet TAKE 1 TABLET BY MOUTH EVERY OTHER DAY 3/8/20  Yes Wilma Ding Job WILSON III, DO   escitalopram oxalate (LEXAPRO) 10 mg tablet TAKE 1 TABLET BY MOUTH EVERY DAY 1/19/20  Yes Job Arroyo III, DO   traZODone (DESYREL) 50 mg tablet TAKE 1/2 TABLET BY MOUTH AT BEDTIME 6/18/20   Job Arroyo III,    torsemide (DEMADEX) 20 mg tablet TAKE 1 TABLET BY MOUTH EVERY DAY 4/14/20   Provider, Historical   vit C/E/Zn/coppr/lutein/zeaxan (PRESERVISION AREDS-2 PO) Take  by mouth daily. Provider, Historical   lansoprazole (PREVACID) 30 mg capsule TAKE 1 CAPSULE BY MOUTH EVERY DAY IN THE MORNING 1/29/20   Job Arroyo III, DO   carvedilol (COREG) 3.125 mg tablet TAKE 1 TABLET BY MOUTH TWICE A DAY 11/19/18   Provider, Historical   losartan (COZAAR) 100 mg tablet TAKE 1 TABLET EVERY DAY 12/5/18   Provider, Historical     Allergies   Allergen Reactions    Augmentin [Amoxicillin-Pot Clavulanate] Rash    Bextra [Valdecoxib] Hives     No longer allergic     Family History   Problem Relation Age of Onset    Hypertension Mother     Heart Disease Mother     Cancer Maternal Aunt         breast     Social History     Socioeconomic History    Marital status:      Spouse name: Not on file    Number of children: Not on file    Years of education: Not on file    Highest education level: Not on file   Occupational History    Not on file   Social Needs    Financial resource strain: Not on file    Food insecurity     Worry: Not on file     Inability: Not on file    Transportation needs     Medical: Not on file     Non-medical: Not on file   Tobacco Use    Smoking status: Never Smoker    Smokeless tobacco: Never Used   Substance and Sexual Activity    Alcohol use:  Yes     Alcohol/week: 1.0 standard drinks     Types: 1 Glasses of wine per week     Comment: occasional    Drug use: Yes     Types: Prescription    Sexual activity: Not Currently   Lifestyle    Physical activity     Days per week: Not on file     Minutes per session: Not on file    Stress: Not on file   Relationships    Social connections     Talks on phone: Not on file     Gets together: Not on file     Attends Alevism service: Not on file     Active member of club or organization: Not on file     Attends meetings of clubs or organizations: Not on file     Relationship status: Not on file    Intimate partner violence     Fear of current or ex partner: Not on file     Emotionally abused: Not on file     Physically abused: Not on file     Forced sexual activity: Not on file   Other Topics Concern    Not on file   Social History Narrative    Not on file       Vitals:    07/14/20 1332   BP: 118/88   Pulse: 89   Resp: 18   Temp: 97.5 °F (36.4 °C)       Review of Systems:    Gen: No fever, chills, malaise, weight loss/gain. Heent: No headache, rhinorrhea, epistaxis, ear pain, hearing loss, sinus pain, neck pain/stiffness, sore throat. Heart: No chest pain, palpitations, ARTEAGA, pnd, or orthopnea. Resp: No cough, hemoptysis, wheezing and shortness of breath. GI: No nausea, vomiting, diarrhea, constipation, melena or hematochezia. : No urinary obstruction, dysuria or hematuria. Derm: see below  Musc/skeletal: no bone or joint complains. Vasc: No edema, cyanosis or claudication. Endo: No heat/cold intolerance, no polyuria,polydipsia or polyphagia. Neuro: No unilateral weakness, numbness, tingling. No seizures. Heme: No easy bruising or bleeding.     Wound Description:   Wound Type: Blister/Gout-Medial 1st MPJ   Indication: Acute <30days   Status: improving   Measurements:    Wound Length:  1.5      Wound Width : 2      Wound Depth : 0.   Tissue Type:   Epithelial: without necrosis  Granulation: without necrosis  Subcutaneous: without necrosis  Slough: yes  Eschar: NA  Tendon: without necrosis  Fascia: without necrosis  Muscle: without necrosis  Bone: without necrosis   Drainage: minimal serosanguineous drainage from wound bed, no gouty tophi noted to wound bed    Wound Type: Blister/Gout-Dorsal 1st MPJ   Indication: Acute <30days   Status: improving   Measurements:    Wound Length:  1.5      Wound Width : 0.7      Wound Depth : 1   Tissue Type:   Epithelial: without necrosis  Granulation: without necrosis  Subcutaneous: without necrosis  Slough: NA  Eschar: NA  Debridement: required today to promote wound healing    Character of Ulcer: Improving    Indication for Debridement: Slough, Exudate, Abnormal wound edge and Abnormal Wound base     Pre debridement measurements: 1.5 cm x 2 cm x 0.1 cm    Risks of procedure were discussed with patient. Consent has been signed. Anesthetic: Lidocaine 4% topical cream     Level of Debridement: Subctaneous Tissue , Muscle, Bone     Tissue and/or Material removed: Bone, Exudate, Fibrin/ Slough, Ligament, Skin and Subcutaneous    Type of Tissue: Viable      Pre-debridement severity: Necrosis of Muscle     Post debridement severity: Necrosis of Bone     Instrument(s) used: Scalpel, Curette and Ronguer    Bleeding controlled with: Pressure and Silver Nitrate    Estimated blood loss: less than 50 ml    Pre debridement measurements: 1.6 cm x 2.1 cm x 0.2 cm    Post procedural pain: mild    Patient tolerated procedure well. Infection:no     Edema:NA    Lower Extremity Circulation   Palpable pulses    Assessment:   1. Ulcer of right medial 1st MPJ with exposed bone  2. Gout    Plan:   -Medial 1st MPJ wound debrided as noted above. Discussed with patient and son that bone and joint are now completely covered with granulation tissue and that there is no tophi noted in the joint at this time. Additionally, the small area that was drained is nearly closed in and there's only one small area were a Q-tip can fit into and probes 1 cm deep.  -Intrasite gel, aquacel, gauze to the medial wound. Packing to the dorsal wound with aquacel  -Cultures did not show any growth, consistent with suspected gout.  Path still pending.  -Follow-up 2 weeks                                                                    PATIENT HAD ALLERGIC RESPONSE TO AUGMENTIN

## 2020-07-15 PROCEDURE — 3331090002 HH PPS REVENUE DEBIT

## 2020-07-15 PROCEDURE — 3331090001 HH PPS REVENUE CREDIT

## 2020-07-16 PROCEDURE — 3331090002 HH PPS REVENUE DEBIT

## 2020-07-16 PROCEDURE — 3331090001 HH PPS REVENUE CREDIT

## 2020-07-17 PROCEDURE — 3331090002 HH PPS REVENUE DEBIT

## 2020-07-17 PROCEDURE — 3331090001 HH PPS REVENUE CREDIT

## 2020-07-18 PROCEDURE — 3331090001 HH PPS REVENUE CREDIT

## 2020-07-18 PROCEDURE — 3331090002 HH PPS REVENUE DEBIT

## 2020-07-19 PROCEDURE — 3331090001 HH PPS REVENUE CREDIT

## 2020-07-19 PROCEDURE — 3331090002 HH PPS REVENUE DEBIT

## 2020-07-20 PROCEDURE — 3331090002 HH PPS REVENUE DEBIT

## 2020-07-20 PROCEDURE — 3331090001 HH PPS REVENUE CREDIT

## 2020-07-21 ENCOUNTER — HOME CARE VISIT (OUTPATIENT)
Dept: SCHEDULING | Facility: HOME HEALTH | Age: 85
End: 2020-07-21
Payer: MEDICARE

## 2020-07-21 ENCOUNTER — HOME CARE VISIT (OUTPATIENT)
Dept: HOME HEALTH SERVICES | Facility: HOME HEALTH | Age: 85
End: 2020-07-21
Payer: MEDICARE

## 2020-07-21 VITALS
OXYGEN SATURATION: 97 % | HEART RATE: 95 BPM | RESPIRATION RATE: 20 BRPM | TEMPERATURE: 97.2 F | DIASTOLIC BLOOD PRESSURE: 60 MMHG | SYSTOLIC BLOOD PRESSURE: 140 MMHG

## 2020-07-21 PROCEDURE — G0299 HHS/HOSPICE OF RN EA 15 MIN: HCPCS

## 2020-07-21 PROCEDURE — 3331090002 HH PPS REVENUE DEBIT

## 2020-07-21 PROCEDURE — 3331090001 HH PPS REVENUE CREDIT

## 2020-07-22 PROCEDURE — 3331090002 HH PPS REVENUE DEBIT

## 2020-07-22 PROCEDURE — 3331090001 HH PPS REVENUE CREDIT

## 2020-07-23 PROCEDURE — A9270 NON-COVERED ITEM OR SERVICE: HCPCS

## 2020-07-23 PROCEDURE — A6446 CONFORM BAND S W>=3" <5"/YD: HCPCS

## 2020-07-23 PROCEDURE — 3331090001 HH PPS REVENUE CREDIT

## 2020-07-23 PROCEDURE — 3331090002 HH PPS REVENUE DEBIT

## 2020-07-24 ENCOUNTER — HOME CARE VISIT (OUTPATIENT)
Dept: SCHEDULING | Facility: HOME HEALTH | Age: 85
End: 2020-07-24
Payer: MEDICARE

## 2020-07-24 VITALS
DIASTOLIC BLOOD PRESSURE: 60 MMHG | HEART RATE: 68 BPM | TEMPERATURE: 96.7 F | RESPIRATION RATE: 14 BRPM | OXYGEN SATURATION: 97 % | SYSTOLIC BLOOD PRESSURE: 142 MMHG

## 2020-07-24 PROCEDURE — 3331090002 HH PPS REVENUE DEBIT

## 2020-07-24 PROCEDURE — G0299 HHS/HOSPICE OF RN EA 15 MIN: HCPCS

## 2020-07-24 PROCEDURE — 3331090001 HH PPS REVENUE CREDIT

## 2020-07-25 PROCEDURE — 3331090002 HH PPS REVENUE DEBIT

## 2020-07-25 PROCEDURE — 3331090001 HH PPS REVENUE CREDIT

## 2020-07-26 PROCEDURE — 3331090002 HH PPS REVENUE DEBIT

## 2020-07-26 PROCEDURE — 3331090001 HH PPS REVENUE CREDIT

## 2020-07-27 ENCOUNTER — APPOINTMENT (OUTPATIENT)
Dept: ULTRASOUND IMAGING | Age: 85
End: 2020-07-27
Attending: EMERGENCY MEDICINE
Payer: COMMERCIAL

## 2020-07-27 ENCOUNTER — HOSPITAL ENCOUNTER (EMERGENCY)
Age: 85
Discharge: HOME | End: 2020-07-27
Attending: EMERGENCY MEDICINE
Payer: COMMERCIAL

## 2020-07-27 ENCOUNTER — HOME CARE VISIT (OUTPATIENT)
Dept: SCHEDULING | Facility: HOME HEALTH | Age: 85
End: 2020-07-27
Payer: MEDICARE

## 2020-07-27 VITALS
RESPIRATION RATE: 18 BRPM | BODY MASS INDEX: 24.28 KG/M2 | HEART RATE: 64 BPM | SYSTOLIC BLOOD PRESSURE: 104 MMHG | HEIGHT: 65 IN | OXYGEN SATURATION: 88 % | WEIGHT: 145.72 LBS | DIASTOLIC BLOOD PRESSURE: 61 MMHG

## 2020-07-27 VITALS
TEMPERATURE: 98.1 F | OXYGEN SATURATION: 94 % | SYSTOLIC BLOOD PRESSURE: 124 MMHG | RESPIRATION RATE: 18 BRPM | DIASTOLIC BLOOD PRESSURE: 52 MMHG | HEART RATE: 63 BPM

## 2020-07-27 DIAGNOSIS — R60.0 PEDAL EDEMA: Primary | ICD-10-CM

## 2020-07-27 LAB
ALBUMIN SERPL-MCNC: 2.6 G/DL (ref 3.5–5)
ALBUMIN/GLOB SERPL: 0.7 {RATIO} (ref 1.1–2.2)
ALP SERPL-CCNC: 78 U/L (ref 45–117)
ALT SERPL-CCNC: 10 U/L (ref 12–78)
ANION GAP SERPL CALC-SCNC: 5 MMOL/L (ref 5–15)
AST SERPL-CCNC: 7 U/L (ref 15–37)
BASOPHILS # BLD: 0.1 K/UL (ref 0–0.1)
BASOPHILS NFR BLD: 1 % (ref 0–1)
BILIRUB SERPL-MCNC: 1.1 MG/DL (ref 0.2–1)
BUN SERPL-MCNC: 22 MG/DL (ref 6–20)
BUN/CREAT SERPL: 16 (ref 12–20)
CALCIUM SERPL-MCNC: 8.8 MG/DL (ref 8.5–10.1)
CHLORIDE SERPL-SCNC: 107 MMOL/L (ref 97–108)
CO2 SERPL-SCNC: 27 MMOL/L (ref 21–32)
CREAT SERPL-MCNC: 1.41 MG/DL (ref 0.55–1.02)
DIFFERENTIAL METHOD BLD: ABNORMAL
EOSINOPHIL # BLD: 0 K/UL (ref 0–0.4)
EOSINOPHIL NFR BLD: 0 % (ref 0–7)
ERYTHROCYTE [DISTWIDTH] IN BLOOD BY AUTOMATED COUNT: 15.7 % (ref 11.5–14.5)
GLOBULIN SER CALC-MCNC: 3.5 G/DL (ref 2–4)
GLUCOSE SERPL-MCNC: 91 MG/DL (ref 65–100)
HCT VFR BLD AUTO: 29.8 % (ref 35–47)
HGB BLD-MCNC: 9.7 G/DL (ref 11.5–16)
IMM GRANULOCYTES # BLD AUTO: 0.1 K/UL (ref 0–0.04)
IMM GRANULOCYTES NFR BLD AUTO: 1 % (ref 0–0.5)
LYMPHOCYTES # BLD: 1.5 K/UL (ref 0.8–3.5)
LYMPHOCYTES NFR BLD: 19 % (ref 12–49)
MCH RBC QN AUTO: 32.4 PG (ref 26–34)
MCHC RBC AUTO-ENTMCNC: 32.6 G/DL (ref 30–36.5)
MCV RBC AUTO: 99.7 FL (ref 80–99)
MONOCYTES # BLD: 0.8 K/UL (ref 0–1)
MONOCYTES NFR BLD: 10 % (ref 5–13)
NEUTS SEG # BLD: 5.2 K/UL (ref 1.8–8)
NEUTS SEG NFR BLD: 69 % (ref 32–75)
NRBC # BLD: 0 K/UL (ref 0–0.01)
NRBC BLD-RTO: 0 PER 100 WBC
PLATELET # BLD AUTO: 242 K/UL (ref 150–400)
PMV BLD AUTO: 9.8 FL (ref 8.9–12.9)
POTASSIUM SERPL-SCNC: 4.1 MMOL/L (ref 3.5–5.1)
PROT SERPL-MCNC: 6.1 G/DL (ref 6.4–8.2)
RBC # BLD AUTO: 2.99 M/UL (ref 3.8–5.2)
SODIUM SERPL-SCNC: 139 MMOL/L (ref 136–145)
WBC # BLD AUTO: 7.6 K/UL (ref 3.6–11)

## 2020-07-27 PROCEDURE — 93005 ELECTROCARDIOGRAM TRACING: CPT

## 2020-07-27 PROCEDURE — G0300 HHS/HOSPICE OF LPN EA 15 MIN: HCPCS

## 2020-07-27 PROCEDURE — 80053 COMPREHEN METABOLIC PANEL: CPT

## 2020-07-27 PROCEDURE — 36415 COLL VENOUS BLD VENIPUNCTURE: CPT

## 2020-07-27 PROCEDURE — 85025 COMPLETE CBC W/AUTO DIFF WBC: CPT

## 2020-07-27 PROCEDURE — 99284 EMERGENCY DEPT VISIT MOD MDM: CPT

## 2020-07-27 PROCEDURE — 3331090001 HH PPS REVENUE CREDIT

## 2020-07-27 PROCEDURE — 3331090002 HH PPS REVENUE DEBIT

## 2020-07-27 PROCEDURE — 93970 EXTREMITY STUDY: CPT

## 2020-07-27 NOTE — DISCHARGE INSTRUCTIONS
To help decrease fluid in the legs elevate your legs as much as possible. I would also recommend compression stockings to be worn on both legs during the daytime hours. These are available at most drug stores. Your kidney function is borderline and I feel any increase in fluid pills will only harm your kidneys. There are no signs of infection. There are no blood clots in your legs. Thank you for visiting our emergency department today. We all do hope that we were able to assist you in your emergent needs today. Please read over your discharge instructions as these contain pertinent information to help you in the healing process. These instructions include a list of prescriptions you were given today. Follow-up information is also noted on your discharge papers. There are attached instructions and information pertaining to the reason why you were seen in the emergency department today. These discharge instructions may not be for exactly why you were here, but may be the closest available instructions that we have. These include important advice for things that you can do at home to feel better, and reasons to return to the emergency department. The evaluation and treatment you received in the emergency department is not always definitive care. If follow-up with your primary care doctor or specialist was recommended, it is important that you make these appointments for follow-up care. You may need further testing, procedures, and/or medications to help you feel better. Further tests may be required that are not available in the emergency department. Failure to make these follow-up appointments may jeopardize your health. The emergency department is here for emergent stabilization and evaluation of life and limb threatening illness and/or injuries. Further care through a specialist or primary care doctor may be required to assist in your healing and complete your treatment and/or evaluation.   We may not always be able to make a diagnosis in the emergency department, or things may change that will alter your diagnosis. Our primary goal is to ensure that nothing serious is occurring and that you are stable to continue your treatment and evaluation at home as an outpatient. Of course, if things change, and you feel worse, you are always encouraged to return to the emergency department for re-evaluation. Lab Results Today:  Recent Results (from the past 8 hour(s))   EKG, 12 LEAD, INITIAL    Collection Time: 07/27/20  1:44 PM   Result Value Ref Range    Ventricular Rate 66 BPM    Atrial Rate 40 BPM    P-R Interval 196 ms    QRS Duration 116 ms    Q-T Interval 460 ms    QTC Calculation (Bezet) 482 ms    Calculated P Axis -102 degrees    Calculated R Axis 25 degrees    Calculated T Axis 102 degrees    Diagnosis       AV dual-paced rhythm with occasional ventricular-paced complexes  When compared with ECG of 21-APR-2017 16:42,  Electronic ventricular pacemaker has replaced Sinus rhythm  Vent. rate has decreased BY  38 BPM     CBC WITH AUTOMATED DIFF    Collection Time: 07/27/20  1:56 PM   Result Value Ref Range    WBC 7.6 3.6 - 11.0 K/uL    RBC 2.99 (L) 3.80 - 5.20 M/uL    HGB 9.7 (L) 11.5 - 16.0 g/dL    HCT 29.8 (L) 35.0 - 47.0 %    MCV 99.7 (H) 80.0 - 99.0 FL    MCH 32.4 26.0 - 34.0 PG    MCHC 32.6 30.0 - 36.5 g/dL    RDW 15.7 (H) 11.5 - 14.5 %    PLATELET 585 029 - 484 K/uL    MPV 9.8 8.9 - 12.9 FL    NRBC 0.0 0  WBC    ABSOLUTE NRBC 0.00 0.00 - 0.01 K/uL    NEUTROPHILS 69 32 - 75 %    LYMPHOCYTES 19 12 - 49 %    MONOCYTES 10 5 - 13 %    EOSINOPHILS 0 0 - 7 %    BASOPHILS 1 0 - 1 %    IMMATURE GRANULOCYTES 1 (H) 0.0 - 0.5 %    ABS. NEUTROPHILS 5.2 1.8 - 8.0 K/UL    ABS. LYMPHOCYTES 1.5 0.8 - 3.5 K/UL    ABS. MONOCYTES 0.8 0.0 - 1.0 K/UL    ABS. EOSINOPHILS 0.0 0.0 - 0.4 K/UL    ABS. BASOPHILS 0.1 0.0 - 0.1 K/UL    ABS. IMM.  GRANS. 0.1 (H) 0.00 - 0.04 K/UL    DF AUTOMATED     METABOLIC PANEL, COMPREHENSIVE    Collection Time: 07/27/20  1:56 PM   Result Value Ref Range    Sodium 139 136 - 145 mmol/L    Potassium 4.1 3.5 - 5.1 mmol/L    Chloride 107 97 - 108 mmol/L    CO2 27 21 - 32 mmol/L    Anion gap 5 5 - 15 mmol/L    Glucose 91 65 - 100 mg/dL    BUN 22 (H) 6 - 20 MG/DL    Creatinine 1.41 (H) 0.55 - 1.02 MG/DL    BUN/Creatinine ratio 16 12 - 20      GFR est AA 42 (L) >60 ml/min/1.73m2    GFR est non-AA 35 (L) >60 ml/min/1.73m2    Calcium 8.8 8.5 - 10.1 MG/DL    Bilirubin, total 1.1 (H) 0.2 - 1.0 MG/DL    ALT (SGPT) 10 (L) 12 - 78 U/L    AST (SGOT) 7 (L) 15 - 37 U/L    Alk. phosphatase 78 45 - 117 U/L    Protein, total 6.1 (L) 6.4 - 8.2 g/dL    Albumin 2.6 (L) 3.5 - 5.0 g/dL    Globulin 3.5 2.0 - 4.0 g/dL    A-G Ratio 0.7 (L) 1.1 - 2.2          Radiology Results Today:  No results found.

## 2020-07-27 NOTE — ED PROVIDER NOTES
EMERGENCY DEPARTMENT HISTORY AND PHYSICAL EXAM      Date: 7/27/2020  Patient Name: Manav Heaton    History of Presenting Illness     Chief Complaint   Patient presents with   Alex Sánchez Referral / Consult     patient is here from Dr. Sanjiv Hoff office she has has some increased swelling in both legs but the right foot has a wound on it so dr. Maryellen Howard sent her sere to have further evbaulation of her legs        History Provided By: Patient and Patient's Son    HPI: Manav Heaton, 80 y.o. female  presents to the ED with cc of leg swelling in bilateral lower extremities. Patient has a history of a pacemaker but no other cardiac disease. She has been having swelling in her legs that is painful in her feet. She also sees podiatry for wounds to her right foot which are undergoing wound care. She denies fevers or chills. No shortness of breath or chest pain. No vomiting or diarrhea. She currently is not on any antibiotics. She does appear to take Eliquis and Demadex. Past History     Past Medical History:  Past Medical History:   Diagnosis Date    Arthritis     GERD (gastroesophageal reflux disease)     Hypertension     Psychiatric disorder     Thyroid disease        Past Surgical History:  No past surgical history on file. Medications:  No current facility-administered medications on file prior to encounter. Current Outpatient Medications on File Prior to Encounter   Medication Sig Dispense Refill    traZODone (DESYREL) 50 mg tablet TAKE 1/2 TABLET BY MOUTH AT BEDTIME 45 Tab 3    acetaminophen (TylenoL) 325 mg tablet Take 650 mg by mouth every six (6) hours as needed for Pain.  levothyroxine (SYNTHROID) 88 mcg tablet TAKE 1 TABLET BY MOUTH EVERY DAY INDICATIONS: HYPOTHYROIDISM 90 Tab 3    apixaban (Eliquis) 2.5 mg tablet Eliquis 2.5 mg tablet   Take 1 tablet twice a day by oral route.       amiodarone (CORDARONE) 200 mg tablet TAKE 1 TABLET BY MOUTH TWICE A DAY      torsemide (DEMADEX) 20 mg tablet TAKE 1 TABLET BY MOUTH EVERY DAY      atorvastatin (LIPITOR) 10 mg tablet TAKE 1 TABLET BY MOUTH EVERY OTHER DAY 45 Tab 3    vit C/E/Zn/coppr/lutein/zeaxan (PRESERVISION AREDS-2 PO) Take  by mouth daily.  lansoprazole (PREVACID) 30 mg capsule TAKE 1 CAPSULE BY MOUTH EVERY DAY IN THE MORNING 90 Cap 3    escitalopram oxalate (LEXAPRO) 10 mg tablet TAKE 1 TABLET BY MOUTH EVERY DAY 90 Tab 3    carvedilol (COREG) 3.125 mg tablet TAKE 1 TABLET BY MOUTH TWICE A DAY  3    losartan (COZAAR) 100 mg tablet TAKE 1 TABLET EVERY DAY  3       Family History:  Family History   Problem Relation Age of Onset    Hypertension Mother     Heart Disease Mother     Cancer Maternal Aunt         breast       Social History:  Social History     Tobacco Use    Smoking status: Never Smoker    Smokeless tobacco: Never Used   Substance Use Topics    Alcohol use: Yes     Alcohol/week: 1.0 standard drinks     Types: 1 Glasses of wine per week     Comment: occasional    Drug use: Yes     Types: Prescription       Allergies: Allergies   Allergen Reactions    Augmentin [Amoxicillin-Pot Clavulanate] Rash    Bextra [Valdecoxib] Hives     No longer allergic       All the above components of the past  history are auto-populated from the electronic record. They have been reviewed and the patient has been interviewed for any pertinent past history that pertains to the patient's chief complaint and reason for visit. Not all pre-populated components may be accurate at the time this note was generated. Review of Systems   Review of Systems   Constitutional: Negative for chills and fever. HENT: Negative for congestion, ear pain, rhinorrhea, sore throat and trouble swallowing. Eyes: Negative for visual disturbance. Respiratory: Negative for cough, chest tightness and shortness of breath. Cardiovascular: Positive for leg swelling. Negative for chest pain and palpitations.    Gastrointestinal: Negative for abdominal pain, blood in stool, constipation, diarrhea, nausea and vomiting. Genitourinary: Negative for decreased urine volume, difficulty urinating, dysuria and frequency. Musculoskeletal: Negative for back pain and neck pain. Skin: Positive for wound. Negative for color change and rash. Neurological: Negative for dizziness, weakness, light-headedness and headaches. Physical Exam   Physical Exam  Vitals signs and nursing note reviewed. Constitutional:       General: She is not in acute distress. Appearance: She is well-developed. She is not ill-appearing. Eyes:      Conjunctiva/sclera: Conjunctivae normal.   Neck:      Musculoskeletal: Neck supple. Cardiovascular:      Rate and Rhythm: Normal rate and regular rhythm. Pulses:           Dorsalis pedis pulses are 2+ on the right side and 2+ on the left side. Posterior tibial pulses are 2+ on the right side and 2+ on the left side. Pulmonary:      Effort: Pulmonary effort is normal. No accessory muscle usage or respiratory distress. Breath sounds: Normal breath sounds. Abdominal:      General: There is no distension. Palpations: Abdomen is soft. Tenderness: There is no abdominal tenderness. Musculoskeletal:      Right lower leg: Edema present. Left lower leg: Edema present. Comments: 3+ pedal edema   Lymphadenopathy:      Cervical: No cervical adenopathy. Skin:     General: Skin is warm and dry. Neurological:      Mental Status: She is alert and oriented to person, place, and time. Cranial Nerves: No cranial nerve deficit. Sensory: No sensory deficit.          Diagnostic Study Results     Labs -     Recent Results (from the past 24 hour(s))   EKG, 12 LEAD, INITIAL    Collection Time: 07/27/20  1:44 PM   Result Value Ref Range    Ventricular Rate 66 BPM    Atrial Rate 40 BPM    P-R Interval 196 ms    QRS Duration 116 ms    Q-T Interval 460 ms    QTC Calculation (Bezet) 482 ms    Calculated P Axis -102 degrees Calculated R Axis 25 degrees    Calculated T Axis 102 degrees    Diagnosis       AV dual-paced rhythm with occasional ventricular-paced complexes  When compared with ECG of 21-APR-2017 16:42,  Electronic ventricular pacemaker has replaced Sinus rhythm  Vent. rate has decreased BY  38 BPM     CBC WITH AUTOMATED DIFF    Collection Time: 07/27/20  1:56 PM   Result Value Ref Range    WBC 7.6 3.6 - 11.0 K/uL    RBC 2.99 (L) 3.80 - 5.20 M/uL    HGB 9.7 (L) 11.5 - 16.0 g/dL    HCT 29.8 (L) 35.0 - 47.0 %    MCV 99.7 (H) 80.0 - 99.0 FL    MCH 32.4 26.0 - 34.0 PG    MCHC 32.6 30.0 - 36.5 g/dL    RDW 15.7 (H) 11.5 - 14.5 %    PLATELET 842 389 - 336 K/uL    MPV 9.8 8.9 - 12.9 FL    NRBC 0.0 0  WBC    ABSOLUTE NRBC 0.00 0.00 - 0.01 K/uL    NEUTROPHILS 69 32 - 75 %    LYMPHOCYTES 19 12 - 49 %    MONOCYTES 10 5 - 13 %    EOSINOPHILS 0 0 - 7 %    BASOPHILS 1 0 - 1 %    IMMATURE GRANULOCYTES 1 (H) 0.0 - 0.5 %    ABS. NEUTROPHILS 5.2 1.8 - 8.0 K/UL    ABS. LYMPHOCYTES 1.5 0.8 - 3.5 K/UL    ABS. MONOCYTES 0.8 0.0 - 1.0 K/UL    ABS. EOSINOPHILS 0.0 0.0 - 0.4 K/UL    ABS. BASOPHILS 0.1 0.0 - 0.1 K/UL    ABS. IMM. GRANS. 0.1 (H) 0.00 - 0.04 K/UL    DF AUTOMATED     METABOLIC PANEL, COMPREHENSIVE    Collection Time: 07/27/20  1:56 PM   Result Value Ref Range    Sodium 139 136 - 145 mmol/L    Potassium 4.1 3.5 - 5.1 mmol/L    Chloride 107 97 - 108 mmol/L    CO2 27 21 - 32 mmol/L    Anion gap 5 5 - 15 mmol/L    Glucose 91 65 - 100 mg/dL    BUN 22 (H) 6 - 20 MG/DL    Creatinine 1.41 (H) 0.55 - 1.02 MG/DL    BUN/Creatinine ratio 16 12 - 20      GFR est AA 42 (L) >60 ml/min/1.73m2    GFR est non-AA 35 (L) >60 ml/min/1.73m2    Calcium 8.8 8.5 - 10.1 MG/DL    Bilirubin, total 1.1 (H) 0.2 - 1.0 MG/DL    ALT (SGPT) 10 (L) 12 - 78 U/L    AST (SGOT) 7 (L) 15 - 37 U/L    Alk.  phosphatase 78 45 - 117 U/L    Protein, total 6.1 (L) 6.4 - 8.2 g/dL    Albumin 2.6 (L) 3.5 - 5.0 g/dL    Globulin 3.5 2.0 - 4.0 g/dL    A-G Ratio 0.7 (L) 1.1 - 2.2 Radiologic Studies -   No orders to display     CT Results  (Last 48 hours)    None        CXR Results  (Last 48 hours)    None            Medical Decision Making     I reviewed the vital signs, available nursing notes, past medical history, past surgical history, family history and social history. Vital Signs-Reviewed the patient's vital signs. Patient Vitals for the past 24 hrs:   Pulse Resp BP SpO2   07/27/20 1352 66 18 95/41 97 %         Records Reviewed: Nursing notes for today's visit have been reviewed. I have also reviewed most recent medical records pertinent to today's complaints, if available in our medical record system. I have also reviewed all labs and imaging results from previous results in comparison to results obtained today. If an EKG was obtained today, it has been compared to previous EKGs, if available. If arriving via EMS, the EMS report has been reviewed if made available to us within the patient's time in the emergency department. Provider Notes (Medical Decision Making): This patient presents with bilateral leg swelling. She has a history of gout and she has healing wounds on her right foot. This may certainly contribute to the swelling but it appears to be mostly a pitting edema. She has no associated shortness of breath or signs of congestive heart failure. She is currently already on torsemide. Bilateral Doppler ultrasounds are negative for DVTs. Her kidney function is borderline with a creatinine of 1.4. I feel like any increase in her diuretic is likely going to further harm her kidneys. I would recommend elevating her legs and using compression stockings. Follow-up with her PCP or cardiologist.  She continues to go to wound care for the ulcerations on her right foot. These appear to be healing well and are not infected. ED Course:   Initial assessment performed.  The patients presenting problems have been discussed, and they are in agreement with the care plan formulated and outlined with them. I have encouraged them to ask questions as they arise throughout their visit. Orders Placed This Encounter    CBC WITH AUTOMATED DIFF    METABOLIC PANEL, COMPREHENSIVE    CHEST PAIN PANEL TRACKING (DO NOT DESELECT)    OXYGEN CANNULA    VITAL SIGNS Per Protocol    MEASURE HEIGHT    WEIGH PATIENT    EKG NOTEWRITER(ASAP ONLY)    OXYGEN CANNULA Liters per minute: 2; Indications for O2 therapy: CHEST PAIN CONTINUOUS STAT    EKG, 12 LEAD, INITIAL    SALINE LOCK IV ONE TIME STAT       EKG    Date/Time: 7/27/2020 1:44 PM  Performed by: Maurice Keita MD  Authorized by: Maurice Keita MD     ECG reviewed by ED Physician in the absence of a cardiologist: yes    Rate:     ECG rate:  66    ECG rate assessment: normal    Rhythm:     Rhythm: paced            Critical Care Time:   0    Disposition:  Discharge    The patient's emergency department evaluation is now complete. I have reviewed all labs, imaging, and pertinent information. I have discussed all results with the patient and/or family. Based on our evaluation today I do believe that the patient is safe to be discharged home. The patient has been provided with at home instructions that are pertinent to their complaint today, although these may not be specific to the exact diagnosis. I have reviewed the patient's home medications and attempted to reconcile if not already done so by pharmacy or nursing staff. I have discussed all new prescriptions with the patient. The patient has been encouraged to follow-up with primary care doctor and/or specialist, and these have been discussed with the patient. The patient has been advised that they may return to the emergency department if they have any worsening symptoms and or new symptoms that are of concern to them.   Verbal discharge instructions may have also been provided to the patient that may not be specifically contained in the written discharge instructions. The patient has been given opportunity to ask questions prior to discharge. PLAN:  1. Current Discharge Medication List        2. Follow-up Information     Follow up With Specialties Details Why Contact Info    Tiffany Dillon MD Cardiology Schedule an appointment as soon as possible for a visit in 1 week  [phone]          Return to ED if worse     Diagnosis     Clinical Impression:   1. Pedal edema            This note will not be viewable in [email or web address].

## 2020-07-27 NOTE — ED NOTES
Discharge paper work given to patient by this RN patients questions and concerns answered. Patient discharged. Patient assisted into wheelchair for discharge. Wheeled patient out to car and assisted patient into car.

## 2020-07-28 ENCOUNTER — HOSPITAL ENCOUNTER (OUTPATIENT)
Dept: WOUND CARE | Age: 85
Discharge: HOME | End: 2020-07-28
Admitting: PODIATRIST
Payer: COMMERCIAL

## 2020-07-28 ENCOUNTER — HOME CARE VISIT (OUTPATIENT)
Dept: HOME HEALTH SERVICES | Facility: HOME HEALTH | Age: 85
End: 2020-07-28
Payer: MEDICARE

## 2020-07-28 VITALS
HEART RATE: 88 BPM | RESPIRATION RATE: 18 BRPM | SYSTOLIC BLOOD PRESSURE: 127 MMHG | TEMPERATURE: 98.6 F | DIASTOLIC BLOOD PRESSURE: 56 MMHG

## 2020-07-28 LAB
ATRIAL RATE: 40 BPM
CALCULATED P AXIS, ECG09: -102 DEGREES
CALCULATED R AXIS, ECG10: 25 DEGREES
CALCULATED T AXIS, ECG11: 102 DEGREES
DIAGNOSIS, 93000: NORMAL
P-R INTERVAL, ECG05: 196 MS
Q-T INTERVAL, ECG07: 460 MS
QRS DURATION, ECG06: 116 MS
QTC CALCULATION (BEZET), ECG08: 482 MS
VENTRICULAR RATE, ECG03: 66 BPM

## 2020-07-28 PROCEDURE — 3331090002 HH PPS REVENUE DEBIT

## 2020-07-28 PROCEDURE — 3331090001 HH PPS REVENUE CREDIT

## 2020-07-28 PROCEDURE — 97597 DBRDMT OPN WND 1ST 20 CM/<: CPT

## 2020-07-28 NOTE — WOUND CARE
07/28/20 1407 Wound Foot Dorsal # 2 Date First Assessed/Time First Assessed: 07/07/20 1607   Primary Wound Type: Blister/bullae  Location: Foot  Wound Location Orientation: Dorsal  Wound Description: # 2 Dressing Type Applied Silver products; Alginate;Gauze;Gauze wrap (jason); Special tape (comment) (intrasite and double tubi) Wound Foot Right;Medial # 1 Date First Assessed/Time First Assessed: 06/09/20 1510   Present on Hospital Admission: Yes  Wound Approximate Age at First Assessment (Weeks): 2 weeks  Primary Wound Type: Blister/bullae  Location: Foot  Wound Location Orientation: Right;Medial  Woun. .. Dressing Type Applied Silver products; Alginate;Gauze (intrasite)

## 2020-07-28 NOTE — DISCHARGE INSTRUCTIONS
Discharge Instructions/Wound Orders  [facility]  [address], [address] [facility]  Telephone: [phone]    NAME:  Kerri Rice OF BIRTH:  5/24/1926  MEDICAL RECORD NUMBER:  [id number]  DATE:  7/28/2020    Wound Care Orders:  Right medial foot wound & right dorsal foot wound - cleanse with saline, apply Intrasite gel, fill defect with silver alginate, cover with gauze, secure with roll gauze, Change 3 x week. apply double layer tubigrip size F. Return to clinic for MD follow-up in 2 weeks. Dietary:  [x] Diet as tolerated: [] Calorie Diabetic Diet: [x] No Added Salt:  [x] Increase Protein: [] Other:Limit the amount of liquid you are drinking and avoid drinking in between meals   Activity:  [x] Activity as tolerated:  [] Patient has no activity restrictions     [] Strict Bedrest: [] Remain off Work:     [] May return to full duty work:                                   [] Return to work with restrictions:             Return Appointment:  [] Wound and dressing supply provider:   [] ECF or Home Healthcare:  [] Wound Assessment: [] Physician or NP scheduled for Wound Assessment:   [x] Return Appointment: With DR Stefani Boykin   in  [facility])  [] Ordered tests:      Electronically signed Johanna Coyne RN on 7/28/2020 at [address]: Should you experience any significant changes in your wound(s) or have questions about your wound care, please contact the [address] [place] [address] at [address] [place] [address] Street 8:00 am - 4:30. If you need help with your wound outside these hours and cannot wait until we are again available, contact your PCP or go to the hospital emergency room. PLEASE NOTE: IF YOU ARE UNABLE TO OBTAIN WOUND SUPPLIES, CONTINUE TO USE THE SUPPLIES YOU HAVE AVAILABLE UNTIL YOU ARE ABLE TO REACH US. IT IS MOST IMPORTANT TO KEEP THE WOUND COVERED AT ALL TIMES.      Physician Signature:_______________________    Date: ___________ Time:  ____________

## 2020-07-28 NOTE — WOUND CARE
07/28/20 1325 Wound Foot Dorsal # 2 Date First Assessed/Time First Assessed: 07/07/20 1607   Primary Wound Type: Blister/bullae  Location: Foot  Wound Location Orientation: Dorsal  Wound Description: # 2 Dressing Status Removed Dressing Type Aquacel;Gauze;Gauze wrap (jason); Special tape (comment) Wound Length (cm) 1.2 cm Wound Width (cm) 0.6 cm Wound Depth (cm) 0.1 cm Wound Surface Area (cm^2) 0.72 cm^2 Wound Volume (cm^3) 0.07 cm^3 Condition of [facility] Condition of Edges Open Drainage Amount Moderate Drainage Color Serosanguinous Wound Odor None Dian-wound Assessment Swelling Cleansing and Cleansing Agents  Normal saline Wound Foot Right;Medial # 1 Date First Assessed/Time First Assessed: 06/09/20 1510   Present on Hospital Admission: Yes  Wound Approximate Age at First Assessment (Weeks): 2 weeks  Primary Wound Type: Blister/bullae  Location: Foot  Wound Location Orientation: Right;Medial  Woun. .. Dressing Status Removed Dressing Type Aquacel;Gauze;Gauze wrap (jason); Special tape (comment) Wound Length (cm) 1.3 cm Wound Width (cm) 1.5 cm Wound Depth (cm) 0.1 cm Wound Surface Area (cm^2) 1.95 cm^2 Wound Volume (cm^3) 0.2 cm^3 Change in Wound Size % 62.5 Drainage Amount Moderate Drainage Color Serous Wound Odor None Dian-wound Assessment Swelling Cleansing and Cleansing Agents  Normal saline Visit Vitals /56 Pulse 88 Temp 98.6 °F (37 °C) Resp 18 RLE Peripheral Vascular Capillary Refill: Less than/equal to 3 seconds (07/28/20 1328) Color: Appropriate for race (07/28/20 1328) Temperature: Warm (07/28/20 1328) Pedal Pulse: Doppler (07/28/20 1328) Circumference of Calf (cm): 37.5 cm (07/28/20 1328) Location of Measurement (Calf): Mid  (07/28/20 1328) Circumference of Ankle (cm): 26 cm (07/28/20 1328) Location of Measurement (Ankle): Upper  (07/28/20 1328)

## 2020-07-28 NOTE — PROGRESS NOTES
Patient presents to wound clinic for: Nakia Bell is a 80 y.o. female who presents for initial wound care of the following: medial right first metatarsophalangeal joint wound. Patient is accompanied at today's appointment by her son who helps relate much of her history. Patient has a history of gout with multiple flare-ups as well as a history of chronic kidney disease. Several months ago, she was started on allopurinol and developed an allergic reaction to it and developed a rash all over as a result, which was particularly worse on the extremities. The allopurinol was discontinued. She then had another gout flareup and patient was started on a prednisone dose pack. Home health care has been doing her dressing changes. Has been having more swelling in the lower extremities. Went to the ER yesterday to rule out a DVT and her venous duplex was negative. Denies f/c/n/v/d. Pertinent Medical History:  Past Medical History:   Diagnosis Date    Arthritis     GERD (gastroesophageal reflux disease)     Hypertension     Psychiatric disorder     Thyroid disease      History reviewed. No pertinent surgical history. Prior to Admission medications    Medication Sig Start Date End Date Taking? Authorizing Provider   acetaminophen (TylenoL) 325 mg tablet Take 650 mg by mouth every six (6) hours as needed for Pain. Yes Provider, Historical   levothyroxine (SYNTHROID) 88 mcg tablet TAKE 1 TABLET BY MOUTH EVERY DAY INDICATIONS: HYPOTHYROIDISM 5/31/20  Yes Job Arroyo III, DO   apixaban (Eliquis) 2.5 mg tablet Eliquis 2.5 mg tablet   Take 1 tablet twice a day by oral route. Yes Provider, Historical   amiodarone (CORDARONE) 200 mg tablet TAKE 1 TABLET BY MOUTH TWICE A DAY 2/22/20  Yes Provider, Historical   atorvastatin (LIPITOR) 10 mg tablet TAKE 1 TABLET BY MOUTH EVERY OTHER DAY 3/8/20  Yes Job Arroyo III, DO   vit C/E/Zn/coppr/lutein/zeaxan (PRESERVISION AREDS-2 PO) Take  by mouth daily.    Yes Provider, Historical   lansoprazole (PREVACID) 30 mg capsule TAKE 1 CAPSULE BY MOUTH EVERY DAY IN THE MORNING 1/29/20  Yes Job Arroyo III, DO   escitalopram oxalate (LEXAPRO) 10 mg tablet TAKE 1 TABLET BY MOUTH EVERY DAY 1/19/20  Yes Job Arroyo III, DO   carvedilol (COREG) 3.125 mg tablet TAKE 1 TABLET BY MOUTH TWICE A DAY 11/19/18  Yes Provider, Historical   losartan (COZAAR) 100 mg tablet TAKE 1 TABLET EVERY DAY 12/5/18  Yes Provider, Historical   traZODone (DESYREL) 50 mg tablet TAKE 1/2 TABLET BY MOUTH AT BEDTIME 6/18/20   Gabriella Arroyo III, DO   torsemide (DEMADEX) 20 mg tablet TAKE 1 TABLET BY MOUTH EVERY DAY 4/14/20   Provider, Historical     Allergies   Allergen Reactions    Augmentin [Amoxicillin-Pot Clavulanate] Rash    Bextra [Valdecoxib] Hives     No longer allergic     Family History   Problem Relation Age of Onset    Hypertension Mother     Heart Disease Mother     Cancer Maternal Aunt         breast     Social History     Socioeconomic History    Marital status:      Spouse name: Not on file    Number of children: Not on file    Years of education: Not on file    Highest education level: Not on file   Occupational History    Not on file   Social Needs    Financial resource strain: Not on file    Food insecurity     Worry: Not on file     Inability: Not on file    Transportation needs     Medical: Not on file     Non-medical: Not on file   Tobacco Use    Smoking status: Never Smoker    Smokeless tobacco: Never Used   Substance and Sexual Activity    Alcohol use:  Yes     Alcohol/week: 1.0 standard drinks     Types: 1 Glasses of wine per week     Comment: occasional    Drug use: Yes     Types: Prescription    Sexual activity: Not Currently   Lifestyle    Physical activity     Days per week: Not on file     Minutes per session: Not on file    Stress: Not on file   Relationships    Social connections     Talks on phone: Not on file     Gets together: Not on file     Attends Mu-ism service: Not on file     Active member of club or organization: Not on file     Attends meetings of clubs or organizations: Not on file     Relationship status: Not on file    Intimate partner violence     Fear of current or ex partner: Not on file     Emotionally abused: Not on file     Physically abused: Not on file     Forced sexual activity: Not on file   Other Topics Concern    Not on file   Social History Narrative    Not on file       Vitals:    07/28/20 1330   BP: 127/56   Pulse: 88   Resp: 18   Temp: 98.6 °F (37 °C)       Review of Systems:    Gen: No fever, chills, malaise, weight loss/gain. Heent: No headache, rhinorrhea, epistaxis, ear pain, hearing loss, sinus pain, neck pain/stiffness, sore throat. Heart: No chest pain, palpitations, ARTEAGA, pnd, or orthopnea. Resp: No cough, hemoptysis, wheezing and shortness of breath. GI: No nausea, vomiting, diarrhea, constipation, melena or hematochezia. : No urinary obstruction, dysuria or hematuria. Derm: see below  Musc/skeletal: no bone or joint complains. Vasc: No edema, cyanosis or claudication. Endo: No heat/cold intolerance, no polyuria,polydipsia or polyphagia. Neuro: No unilateral weakness, numbness, tingling. No seizures. Heme: No easy bruising or bleeding.     Wound Description:   Wound Type: Blister/Gout-Medial 1st MPJ   Indication: Acute <30days   Status: improving   Measurements:    Wound Length:  1.3      Wound Width : 1.5      Wound Depth : 0.1   Tissue Type:   Epithelial: without necrosis  Granulation: without necrosis; hypergranular tissue  Subcutaneous: without necrosis  Slough: yes  Eschar: NA  Tendon: without necrosis  Fascia: without necrosis  Muscle: without necrosis  Bone: without necrosis   Drainage: minimal serosanguineous drainage from wound bed, no gouty tophi noted to wound bed    Wound Type: Blister/Gout-Dorsal 1st MPJ   Indication: Acute <30days   Status: improving   Measurements:    Wound Length:  1.2      Wound Width : 0.6      Wound Depth : 0.1   Tissue Type:   Epithelial: without necrosis  Granulation: without necrosis; hypergranular  Subcutaneous: without necrosis  Slough: NA  Eschar: NA  Debridement: required today to promote wound healing    Character of Ulcer: Improving    Indication for Debridement: Slough, Exudate, Abnormal wound edge and Abnormal Wound base     Pre debridement measurements: 1.3 cm x 1.5 cm x 0.1 cm    Risks of procedure were discussed with patient. Consent has been signed. Anesthetic: Lidocaine 4% topical cream     Level of Debridement: Subctaneous Tissue , Muscle, Bone     Tissue and/or Material removed: Bone, Exudate, Fibrin/ Slough, Ligament, Skin and Subcutaneous    Type of Tissue: Viable      Pre-debridement severity: Necrosis of Muscle     Post debridement severity: Necrosis of Bone     Instrument(s) used: Scalpel, Curette and Ronguer    Bleeding controlled with: Pressure and Silver Nitrate    Estimated blood loss: less than 50 ml    Pre debridement measurements: 1..4 cm x 1.6 cm x 0.2 cm    Post procedural pain: mild    Patient tolerated procedure well. Infection:no     Edema:NA    Lower Extremity Circulation   Palpable pulses    Assessment:   1. Ulcer of right medial 1st MPJ with exposed bone  2. Gout    Plan:   -Medial 1st MPJ wound debrided as noted above. Discussed with patient and son that bone and joint are now completely covered with granulation tissue and that there is no tophi noted in the joint at this time.  -Intrasite gel, aquacel, gauze to the medial wound.   Packing to the dorsal wound with aquacel  -Double tubigrips to the lower extremities.  -Follow-up 2 weeks                                                                    PATIENT HAD ALLERGIC RESPONSE TO AUGMENTIN

## 2020-07-29 ENCOUNTER — HOME CARE VISIT (OUTPATIENT)
Dept: HOME HEALTH SERVICES | Facility: HOME HEALTH | Age: 85
End: 2020-07-29
Payer: MEDICARE

## 2020-07-29 ENCOUNTER — HOME CARE VISIT (OUTPATIENT)
Dept: SCHEDULING | Facility: HOME HEALTH | Age: 85
End: 2020-07-29
Payer: MEDICARE

## 2020-07-29 VITALS
RESPIRATION RATE: 18 BRPM | DIASTOLIC BLOOD PRESSURE: 60 MMHG | SYSTOLIC BLOOD PRESSURE: 110 MMHG | TEMPERATURE: 98.5 F | HEART RATE: 63 BPM | OXYGEN SATURATION: 97 %

## 2020-07-29 PROCEDURE — A6457 TUBULAR DRESSING: HCPCS

## 2020-07-29 PROCEDURE — 3331090001 HH PPS REVENUE CREDIT

## 2020-07-29 PROCEDURE — G0299 HHS/HOSPICE OF RN EA 15 MIN: HCPCS

## 2020-07-29 PROCEDURE — 3331090002 HH PPS REVENUE DEBIT

## 2020-07-30 ENCOUNTER — TELEPHONE (OUTPATIENT)
Dept: INTERNAL MEDICINE CLINIC | Age: 85
End: 2020-07-30

## 2020-07-30 PROCEDURE — 3331090002 HH PPS REVENUE DEBIT

## 2020-07-30 PROCEDURE — 3331090001 HH PPS REVENUE CREDIT

## 2020-07-30 NOTE — TELEPHONE ENCOUNTER
----- Message from Durenda Castleman sent at 7/29/2020  5:09 PM EDT -----  Regarding: Dr. Monica King Message/Vendor Calls    Caller's first and last name: Juan Antonio Andrade with ([employer])      Reason for call: Regarding adding PT, OT to pt orders.        Call back required yes/no and why: Yes       Best contact number(s): [phone]      Details to clarify the request:      Durenda Castleman

## 2020-07-30 NOTE — TELEPHONE ENCOUNTER
Verbal OK given for PT/OT. Lynn verbalized understanding of information discussed w/ no further questions at this time.

## 2020-07-31 ENCOUNTER — HOME CARE VISIT (OUTPATIENT)
Dept: SCHEDULING | Facility: HOME HEALTH | Age: 85
End: 2020-07-31
Payer: MEDICARE

## 2020-07-31 VITALS
SYSTOLIC BLOOD PRESSURE: 110 MMHG | RESPIRATION RATE: 18 BRPM | DIASTOLIC BLOOD PRESSURE: 60 MMHG | OXYGEN SATURATION: 98 % | HEART RATE: 63 BPM | TEMPERATURE: 97.4 F

## 2020-07-31 PROCEDURE — 3331090002 HH PPS REVENUE DEBIT

## 2020-07-31 PROCEDURE — 3331090001 HH PPS REVENUE CREDIT

## 2020-07-31 PROCEDURE — G0300 HHS/HOSPICE OF LPN EA 15 MIN: HCPCS

## 2020-08-01 PROCEDURE — 3331090001 HH PPS REVENUE CREDIT

## 2020-08-01 PROCEDURE — 3331090002 HH PPS REVENUE DEBIT

## 2020-08-02 PROCEDURE — 3331090001 HH PPS REVENUE CREDIT

## 2020-08-02 PROCEDURE — 3331090002 HH PPS REVENUE DEBIT

## 2020-08-03 ENCOUNTER — HOME CARE VISIT (OUTPATIENT)
Dept: SCHEDULING | Facility: HOME HEALTH | Age: 85
End: 2020-08-03
Payer: MEDICARE

## 2020-08-03 VITALS
OXYGEN SATURATION: 96 % | TEMPERATURE: 98.1 F | RESPIRATION RATE: 18 BRPM | HEART RATE: 61 BPM | SYSTOLIC BLOOD PRESSURE: 129 MMHG | DIASTOLIC BLOOD PRESSURE: 74 MMHG

## 2020-08-03 PROCEDURE — 3331090002 HH PPS REVENUE DEBIT

## 2020-08-03 PROCEDURE — 3331090001 HH PPS REVENUE CREDIT

## 2020-08-03 PROCEDURE — G0299 HHS/HOSPICE OF RN EA 15 MIN: HCPCS

## 2020-08-03 PROCEDURE — 400014 HH F/U

## 2020-08-04 ENCOUNTER — HOME CARE VISIT (OUTPATIENT)
Dept: SCHEDULING | Facility: HOME HEALTH | Age: 85
End: 2020-08-04
Payer: MEDICARE

## 2020-08-04 PROCEDURE — 3331090001 HH PPS REVENUE CREDIT

## 2020-08-04 PROCEDURE — G0151 HHCP-SERV OF PT,EA 15 MIN: HCPCS

## 2020-08-04 PROCEDURE — 3331090002 HH PPS REVENUE DEBIT

## 2020-08-05 ENCOUNTER — HOME CARE VISIT (OUTPATIENT)
Dept: SCHEDULING | Facility: HOME HEALTH | Age: 85
End: 2020-08-05
Payer: MEDICARE

## 2020-08-05 ENCOUNTER — HOME CARE VISIT (OUTPATIENT)
Dept: HOME HEALTH SERVICES | Facility: HOME HEALTH | Age: 85
End: 2020-08-05
Payer: MEDICARE

## 2020-08-05 VITALS
DIASTOLIC BLOOD PRESSURE: 70 MMHG | TEMPERATURE: 97.8 F | HEART RATE: 60 BPM | OXYGEN SATURATION: 96 % | RESPIRATION RATE: 18 BRPM | SYSTOLIC BLOOD PRESSURE: 130 MMHG

## 2020-08-05 VITALS
HEART RATE: 72 BPM | DIASTOLIC BLOOD PRESSURE: 76 MMHG | SYSTOLIC BLOOD PRESSURE: 118 MMHG | TEMPERATURE: 97.4 F | OXYGEN SATURATION: 96 % | RESPIRATION RATE: 18 BRPM

## 2020-08-05 PROCEDURE — G0152 HHCP-SERV OF OT,EA 15 MIN: HCPCS

## 2020-08-05 PROCEDURE — G0300 HHS/HOSPICE OF LPN EA 15 MIN: HCPCS

## 2020-08-05 PROCEDURE — 3331090002 HH PPS REVENUE DEBIT

## 2020-08-05 PROCEDURE — 3331090001 HH PPS REVENUE CREDIT

## 2020-08-06 PROCEDURE — 3331090001 HH PPS REVENUE CREDIT

## 2020-08-06 PROCEDURE — 3331090002 HH PPS REVENUE DEBIT

## 2020-08-07 ENCOUNTER — HOME CARE VISIT (OUTPATIENT)
Dept: SCHEDULING | Facility: HOME HEALTH | Age: 85
End: 2020-08-07
Payer: MEDICARE

## 2020-08-07 VITALS
RESPIRATION RATE: 18 BRPM | TEMPERATURE: 97.6 F | SYSTOLIC BLOOD PRESSURE: 130 MMHG | HEART RATE: 80 BPM | OXYGEN SATURATION: 95 % | DIASTOLIC BLOOD PRESSURE: 60 MMHG

## 2020-08-07 PROCEDURE — 3331090002 HH PPS REVENUE DEBIT

## 2020-08-07 PROCEDURE — 3331090001 HH PPS REVENUE CREDIT

## 2020-08-07 PROCEDURE — G0157 HHC PT ASSISTANT EA 15: HCPCS

## 2020-08-07 PROCEDURE — G0300 HHS/HOSPICE OF LPN EA 15 MIN: HCPCS

## 2020-08-08 PROCEDURE — 3331090001 HH PPS REVENUE CREDIT

## 2020-08-08 PROCEDURE — 3331090002 HH PPS REVENUE DEBIT

## 2020-08-09 VITALS
HEART RATE: 62 BPM | OXYGEN SATURATION: 96 % | SYSTOLIC BLOOD PRESSURE: 120 MMHG | TEMPERATURE: 98.2 F | DIASTOLIC BLOOD PRESSURE: 58 MMHG

## 2020-08-09 PROCEDURE — 3331090002 HH PPS REVENUE DEBIT

## 2020-08-09 PROCEDURE — 3331090001 HH PPS REVENUE CREDIT

## 2020-08-10 ENCOUNTER — HOME CARE VISIT (OUTPATIENT)
Dept: SCHEDULING | Facility: HOME HEALTH | Age: 85
End: 2020-08-10
Payer: MEDICARE

## 2020-08-10 VITALS
SYSTOLIC BLOOD PRESSURE: 140 MMHG | TEMPERATURE: 96.6 F | DIASTOLIC BLOOD PRESSURE: 72 MMHG | OXYGEN SATURATION: 97 % | RESPIRATION RATE: 15 BRPM | HEART RATE: 75 BPM

## 2020-08-10 PROCEDURE — G0300 HHS/HOSPICE OF LPN EA 15 MIN: HCPCS

## 2020-08-10 PROCEDURE — 3331090002 HH PPS REVENUE DEBIT

## 2020-08-10 PROCEDURE — 3331090001 HH PPS REVENUE CREDIT

## 2020-08-11 ENCOUNTER — HOSPITAL ENCOUNTER (OUTPATIENT)
Dept: WOUND CARE | Age: 85
Discharge: HOME | End: 2020-08-11
Admitting: PODIATRIST
Payer: COMMERCIAL

## 2020-08-11 VITALS
RESPIRATION RATE: 18 BRPM | SYSTOLIC BLOOD PRESSURE: 134 MMHG | TEMPERATURE: 97.5 F | HEART RATE: 86 BPM | DIASTOLIC BLOOD PRESSURE: 60 MMHG

## 2020-08-11 PROBLEM — L97.412 ULCER OF RIGHT MIDFOOT WITH FAT LAYER EXPOSED (HCC): Status: ACTIVE | Noted: 2020-06-09

## 2020-08-11 PROCEDURE — 97597 DBRDMT OPN WND 1ST 20 CM/<: CPT

## 2020-08-11 PROCEDURE — 3331090001 HH PPS REVENUE CREDIT

## 2020-08-11 PROCEDURE — 3331090002 HH PPS REVENUE DEBIT

## 2020-08-11 NOTE — PROGRESS NOTES
Patient presents to wound clinic for: Giovanni Zarco is a 80 y.o. female who presents for initial wound care of the following: medial right first metatarsophalangeal joint wound. Patient is accompanied at today's appointment by her son who helps relate much of her history. Patient has a history of gout with multiple flare-ups as well as a history of chronic kidney disease. Several months ago, she was started on allopurinol and developed an allergic reaction to it and developed a rash all over as a result, which was particularly worse on the extremities. The allopurinol was discontinued. She then had another gout flareup and patient was started on a prednisone dose pack. Home health care has been doing her dressing changes. Has been having more swelling in the lower extremities. Notes that she has been having swelling in the lower extremities but that she has nto been elevating her legs. No pain in the feet. States that she may be moving to [place] next month. Denies f/c/n/v/d. Pertinent Medical History:  Past Medical History:   Diagnosis Date    Arthritis     GERD (gastroesophageal reflux disease)     Hypertension     Psychiatric disorder     Thyroid disease      History reviewed. No pertinent surgical history. Prior to Admission medications    Medication Sig Start Date End Date Taking? Authorizing Provider   febuxostat (ULORIC) 80 mg tab tablet Take 80 mg by mouth daily. Yes Provider, Historical   traZODone (DESYREL) 50 mg tablet TAKE 1/2 TABLET BY MOUTH AT BEDTIME 6/18/20  Yes Job Arroyo III, DO   levothyroxine (SYNTHROID) 88 mcg tablet TAKE 1 TABLET BY MOUTH EVERY DAY INDICATIONS: HYPOTHYROIDISM 5/31/20  Yes Job Arroyo III, DO   apixaban (Eliquis) 2.5 mg tablet Eliquis 2.5 mg tablet   Take 1 tablet twice a day by oral route.    Yes Provider, Historical   amiodarone (CORDARONE) 200 mg tablet TAKE 1 TABLET BY MOUTH TWICE A DAY 2/22/20  Yes Provider, Historical   torsemide ([facility] [place]note 2]) 20 mg tablet TAKE 1 TABLET BY MOUTH EVERY DAY 4/14/20  Yes Provider, Historical   atorvastatin (LIPITOR) 10 mg tablet TAKE 1 TABLET BY MOUTH EVERY OTHER DAY 3/8/20  Yes Job Arroyo III, DO   vit C/E/Zn/coppr/lutein/zeaxan (PRESERVISION AREDS-2 PO) Take  by mouth daily. Yes Provider, Historical   lansoprazole (PREVACID) 30 mg capsule TAKE 1 CAPSULE BY MOUTH EVERY DAY IN THE MORNING 1/29/20  Yes Job Arroyo III, DO   escitalopram oxalate (LEXAPRO) 10 mg tablet TAKE 1 TABLET BY MOUTH EVERY DAY 1/19/20  Yes Job Arroyo III, DO   carvedilol (COREG) 3.125 mg tablet TAKE 1 TABLET BY MOUTH TWICE A DAY 11/19/18  Yes Provider, Historical   losartan (COZAAR) 100 mg tablet TAKE 1 TABLET EVERY DAY 12/5/18  Yes Provider, Historical   amiodarone (CORDARONE) 200 mg tablet Take 200 mg by mouth every other day. Provider, Historical   torsemide (DEMADEX) 20 mg tablet Take 20 mg by mouth daily. Provider, Historical   acetaminophen (TYLENOL) 500 mg tablet Take 1,000 mg by mouth every six (6) hours as needed for Pain. Provider, Historical   acetaminophen (TylenoL) 325 mg tablet Take 650 mg by mouth every six (6) hours as needed for Pain.     Provider, Historical     Allergies   Allergen Reactions    Augmentin [Amoxicillin-Pot Clavulanate] Rash    Bextra [Valdecoxib] Hives     No longer allergic     Family History   Problem Relation Age of Onset    Hypertension Mother     Heart Disease Mother     Cancer Maternal Aunt         breast     Social History     Socioeconomic History    Marital status:      Spouse name: Not on file    Number of children: Not on file    Years of education: Not on file    Highest education level: Not on file   Occupational History    Not on file   Social Needs    Financial resource strain: Not on file    Food insecurity     Worry: Not on file     Inability: Not on file    Transportation needs     Medical: Not on file     Non-medical: Not on file   Tobacco Use    Smoking status: Never Smoker    Smokeless tobacco: Never Used   Substance and Sexual Activity    Alcohol use: Yes     Alcohol/week: 1.0 standard drinks     Types: 1 Glasses of wine per week     Comment: occasional    Drug use: Yes     Types: Prescription    Sexual activity: Not Currently   Lifestyle    Physical activity     Days per week: Not on file     Minutes per session: Not on file    Stress: Not on file   Relationships    Social connections     Talks on phone: Not on file     Gets together: Not on file     Attends Christian service: Not on file     Active member of club or organization: Not on file     Attends meetings of clubs or organizations: Not on file     Relationship status: Not on file    Intimate partner violence     Fear of current or ex partner: Not on file     Emotionally abused: Not on file     Physically abused: Not on file     Forced sexual activity: Not on file   Other Topics Concern    Not on file   Social History Narrative    Not on file       Vitals:    08/11/20 1321   BP: 134/60   Pulse: 86   Resp: 18   Temp: 97.5 °F (36.4 °C)       Review of Systems:    Gen: No fever, chills, malaise, weight loss/gain. Heent: No headache, rhinorrhea, epistaxis, ear pain, hearing loss, sinus pain, neck pain/stiffness, sore throat. Heart: No chest pain, palpitations, ARTEAGA, pnd, or orthopnea. Resp: No cough, hemoptysis, wheezing and shortness of breath. GI: No nausea, vomiting, diarrhea, constipation, melena or hematochezia. : No urinary obstruction, dysuria or hematuria. Derm: see below  Musc/skeletal: no bone or joint complains. Vasc: No edema, cyanosis or claudication. Endo: No heat/cold intolerance, no polyuria,polydipsia or polyphagia. Neuro: No unilateral weakness, numbness, tingling. No seizures. Heme: No easy bruising or bleeding.     Wound Description:   Wound Type: Blister/Gout-Medial 1st MPJ   Indication: Chronic > 30days   Status: improving   Measurements:    Wound Length:  1      Wound Width : 1.2     Wound Depth : 0.2   Tissue Type:   Epithelial: without necrosis  Granulation: without necrosis; hypergranular tissue  Subcutaneous: without necrosis  Slough: yes  Eschar: NA  Tendon: without necrosis  Fascia: without necrosis  Muscle: without necrosis  Bone: without necrosis   Drainage: minimal serosanguineous drainage from wound bed, no gouty tophi noted to wound bed, hypergranular tissue    Wound Type: Blister/Gout-Dorsal 1st MPJ   Indication: Chronic > 30days   Status: improving   Measurements:    Wound Length:  0.7      Wound Width : 0.2      Wound Depth : 0.1   Tissue Type:   Epithelial: without necrosis  Granulation: without necrosis; hypergranular  Subcutaneous: without necrosis  Slough: NA  Eschar: NA  Debridement: required today to promote wound healing    Character of Ulcer: Improving    Indication for Debridement: Slough, Exudate, Abnormal wound edge and Abnormal Wound base     Pre debridement measurements: 1 cm x 1.2 cm x 0.2 cm    Risks of procedure were discussed with patient. Consent has been signed. Anesthetic: Lidocaine 4% topical cream     Level of Debridement: Subctaneous Tissue , Muscle, Bone     Tissue and/or Material removed: Bone, Exudate, Fibrin/ Slough, Ligament, Skin and Subcutaneous    Type of Tissue: Viable      Pre-debridement severity: Necrosis of Muscle     Post debridement severity: Necrosis of Bone     Instrument(s) used: Scalpel, Curette and Ronguer    Bleeding controlled with: Pressure and Silver Nitrate    Estimated blood loss: less than 50 ml    Pre debridement measurements: 1.1 cm x 1.3 cm x 0.3 cm    Post procedural pain: mild    Patient tolerated procedure well. Infection:no     Edema:NA    Lower Extremity Circulation   Palpable pulses    Assessment:   1. Ulcer of right medial 1st MPJ with exposed bone  2. Gout    Plan:   -Medial 1st MPJ wound debrided as noted above. Discussed with patient and son that bone and joint are now completely covered with granulation tissue and that there is no tophi noted in the joint at this time.  -Intrasite gel, aquacel, gauze to the medial wound and dorsal wounds  -Double tubigrips to the lower extremities.  -Follow-up 3  weeks                                                                    PATIENT HAD ALLERGIC RESPONSE TO AUGMENTIN

## 2020-08-11 NOTE — DISCHARGE INSTRUCTIONS
Discharge Instructions/Wound Orders  [facility]  [address], [address] [facility]  Telephone: [phone]    NAME:  Andrew Vale OF BIRTH:  5/24/1926  MEDICAL RECORD NUMBER:  [id number]  DATE:  8/11/2020    Wound Care Orders:  Right medial foot wound & right dorsal foot wound - cleanse with saline, apply Intrasite gel, fill defect with silver alginate, cover with gauze, secure with roll gauze, Change 3 x week.    apply double layer tubigrip size F. Return to clinic for MD follow-up in 3 weeks. Dietary:  [x] Diet as tolerated: [] Calorie Diabetic Diet: [x] No Added Salt:  [x] Increase Protein: [] Other:Limit the amount of liquid you are drinking and avoid drinking in between meals   Activity:  [x] Activity as tolerated:  [] Patient has no activity restrictions     [] Strict Bedrest: [] Remain off Work:     [] May return to full duty work:                                   [] Return to work with restrictions:             Return Appointment:  [] Wound and dressing supply provider:   [] ECF or Home Healthcare:  [] Wound Assessment: [] Physician or NP scheduled for Wound Assessment:   [x] Return Appointment: With DR Xi Castillo   in  3 Week(s)  [] Ordered tests:      Electronically signed Melanie Argueta RN on 8/11/2020 at 1:40 PM     Carrie Byrnes 281: Should you experience any significant changes in your wound(s) or have questions about your wound care, please contact the [address] [place] [address] at [address] [place] [address] 8:00 am - 4:30. If you need help with your wound outside these hours and cannot wait until we are again available, contact your PCP or go to the hospital emergency room. PLEASE NOTE: IF YOU ARE UNABLE TO OBTAIN WOUND SUPPLIES, CONTINUE TO USE THE SUPPLIES YOU HAVE AVAILABLE UNTIL YOU ARE ABLE TO REACH US. IT IS MOST IMPORTANT TO KEEP THE WOUND COVERED AT ALL TIMES.      Physician Signature:_______________________    Date: ___________ Time:  ____________

## 2020-08-11 NOTE — WOUND CARE
08/11/20 1323 Wound Foot Dorsal # 2 Date First Assessed/Time First Assessed: 07/07/20 1607   Primary Wound Type: Blister/bullae  Location: Foot  Wound Location Orientation: Dorsal  Wound Description: # 2 Dressing Status Removed Dressing Type Gauze;Gauze wrap (jason); Special tape (comment) (Aquacel Ag) Wound Length (cm) 0.7 cm Wound Width (cm) 0.2 cm Wound Depth (cm) 0.1 cm Wound Surface Area (cm^2) 0.14 cm^2 Wound Volume (cm^3) 0.01 cm^3 Condition of Base Pink;Slough Condition of Edges Open Drainage Amount Moderate Drainage Color Serosanguinous Wound Odor None Dian-wound Assessment Intact Cleansing and Cleansing Agents  Normal saline Wound Foot Right;Medial # 1 Date First Assessed/Time First Assessed: 06/09/20 1510   Present on Hospital Admission: Yes  Wound Approximate Age at First Assessment (Weeks): 2 weeks  Primary Wound Type: Blister/bullae  Location: Foot  Wound Location Orientation: Right;Medial  Woun. .. Dressing Status Removed Dressing Type Gauze;Gauze wrap (jason); Special tape (comment) (Aquacel Ag) Non-staged Wound Description Full thickness Wound Length (cm) 1 cm Wound Width (cm) 1.2 cm Wound Depth (cm) 0.2 cm Wound Surface Area (cm^2) 1.2 cm^2 Wound Volume (cm^3) 0.24 cm^3 Change in Wound Size % 76.92 Condition of Base Pink;Slough Condition of Edges Open Drainage Amount Moderate Drainage Color Serosanguinous Wound Odor None Dian-wound Assessment Intact Cleansing and Cleansing Agents  Normal saline Visit Vitals /60 (BP 1 Location: Right arm, BP Patient Position: At rest) Pulse 86 Temp 97.5 °F (36.4 °C) Resp 18 RLE Peripheral Vascular Capillary Refill: Less than/equal to 3 seconds (08/11/20 1322) Color: Appropriate for race (08/11/20 1322) Temperature: Warm (08/11/20 1322) Sensation: Present (08/11/20 1322) Pedal Pulse: Palpable (08/11/20 1322) Circumference of Calf (cm): 36 cm (08/11/20 1322) Location of Measurement (Calf): Mid  (08/11/20 1322) Circumference of Ankle (cm): 24.5 cm (08/11/20 1322) Location of Measurement (Ankle): Upper  (08/11/20 1322)

## 2020-08-12 ENCOUNTER — HOME CARE VISIT (OUTPATIENT)
Dept: SCHEDULING | Facility: HOME HEALTH | Age: 85
End: 2020-08-12
Payer: MEDICARE

## 2020-08-12 ENCOUNTER — HOME CARE VISIT (OUTPATIENT)
Dept: HOME HEALTH SERVICES | Facility: HOME HEALTH | Age: 85
End: 2020-08-12
Payer: MEDICARE

## 2020-08-12 PROCEDURE — 3331090002 HH PPS REVENUE DEBIT

## 2020-08-12 PROCEDURE — 3331090001 HH PPS REVENUE CREDIT

## 2020-08-13 PROCEDURE — 3331090001 HH PPS REVENUE CREDIT

## 2020-08-13 PROCEDURE — 3331090002 HH PPS REVENUE DEBIT

## 2020-08-14 ENCOUNTER — HOME CARE VISIT (OUTPATIENT)
Dept: HOME HEALTH SERVICES | Facility: HOME HEALTH | Age: 85
End: 2020-08-14
Payer: MEDICARE

## 2020-08-14 ENCOUNTER — HOME CARE VISIT (OUTPATIENT)
Dept: SCHEDULING | Facility: HOME HEALTH | Age: 85
End: 2020-08-14
Payer: MEDICARE

## 2020-08-14 PROCEDURE — 3331090001 HH PPS REVENUE CREDIT

## 2020-08-14 PROCEDURE — 3331090002 HH PPS REVENUE DEBIT

## 2020-08-15 PROCEDURE — 3331090002 HH PPS REVENUE DEBIT

## 2020-08-15 PROCEDURE — 3331090001 HH PPS REVENUE CREDIT

## 2020-08-16 PROCEDURE — 3331090001 HH PPS REVENUE CREDIT

## 2020-08-16 PROCEDURE — 3331090002 HH PPS REVENUE DEBIT

## 2020-08-17 ENCOUNTER — HOME CARE VISIT (OUTPATIENT)
Dept: SCHEDULING | Facility: HOME HEALTH | Age: 85
End: 2020-08-17
Payer: MEDICARE

## 2020-08-17 ENCOUNTER — HOME CARE VISIT (OUTPATIENT)
Dept: HOME HEALTH SERVICES | Facility: HOME HEALTH | Age: 85
End: 2020-08-17
Payer: MEDICARE

## 2020-08-17 VITALS
OXYGEN SATURATION: 98 % | HEART RATE: 75 BPM | DIASTOLIC BLOOD PRESSURE: 58 MMHG | TEMPERATURE: 98.1 F | SYSTOLIC BLOOD PRESSURE: 132 MMHG

## 2020-08-17 VITALS
HEART RATE: 78 BPM | RESPIRATION RATE: 14 BRPM | TEMPERATURE: 98.6 F | DIASTOLIC BLOOD PRESSURE: 73 MMHG | OXYGEN SATURATION: 98 % | SYSTOLIC BLOOD PRESSURE: 124 MMHG

## 2020-08-17 PROCEDURE — G0299 HHS/HOSPICE OF RN EA 15 MIN: HCPCS

## 2020-08-17 PROCEDURE — 3331090001 HH PPS REVENUE CREDIT

## 2020-08-17 PROCEDURE — G0157 HHC PT ASSISTANT EA 15: HCPCS

## 2020-08-17 PROCEDURE — 3331090002 HH PPS REVENUE DEBIT

## 2020-08-18 PROCEDURE — 3331090002 HH PPS REVENUE DEBIT

## 2020-08-18 PROCEDURE — 3331090001 HH PPS REVENUE CREDIT

## 2020-08-19 ENCOUNTER — HOME CARE VISIT (OUTPATIENT)
Dept: SCHEDULING | Facility: HOME HEALTH | Age: 85
End: 2020-08-19
Payer: MEDICARE

## 2020-08-19 ENCOUNTER — HOME CARE VISIT (OUTPATIENT)
Dept: HOME HEALTH SERVICES | Facility: HOME HEALTH | Age: 85
End: 2020-08-19
Payer: MEDICARE

## 2020-08-19 VITALS — HEART RATE: 74 BPM | OXYGEN SATURATION: 94 % | RESPIRATION RATE: 14 BRPM | TEMPERATURE: 98.9 F

## 2020-08-19 PROCEDURE — 3331090001 HH PPS REVENUE CREDIT

## 2020-08-19 PROCEDURE — 3331090002 HH PPS REVENUE DEBIT

## 2020-08-19 PROCEDURE — G0299 HHS/HOSPICE OF RN EA 15 MIN: HCPCS

## 2020-08-19 PROCEDURE — G0152 HHCP-SERV OF OT,EA 15 MIN: HCPCS

## 2020-08-20 ENCOUNTER — HOME CARE VISIT (OUTPATIENT)
Dept: SCHEDULING | Facility: HOME HEALTH | Age: 85
End: 2020-08-20
Payer: MEDICARE

## 2020-08-20 ENCOUNTER — HOME CARE VISIT (OUTPATIENT)
Dept: HOME HEALTH SERVICES | Facility: HOME HEALTH | Age: 85
End: 2020-08-20
Payer: MEDICARE

## 2020-08-20 PROCEDURE — 3331090002 HH PPS REVENUE DEBIT

## 2020-08-20 PROCEDURE — 3331090001 HH PPS REVENUE CREDIT

## 2020-08-20 PROCEDURE — G0157 HHC PT ASSISTANT EA 15: HCPCS

## 2020-08-21 ENCOUNTER — HOME CARE VISIT (OUTPATIENT)
Dept: SCHEDULING | Facility: HOME HEALTH | Age: 85
End: 2020-08-21
Payer: MEDICARE

## 2020-08-21 VITALS
TEMPERATURE: 98.3 F | OXYGEN SATURATION: 95 % | HEART RATE: 67 BPM | RESPIRATION RATE: 17 BRPM | DIASTOLIC BLOOD PRESSURE: 68 MMHG | SYSTOLIC BLOOD PRESSURE: 122 MMHG

## 2020-08-21 PROCEDURE — 3331090002 HH PPS REVENUE DEBIT

## 2020-08-21 PROCEDURE — 3331090001 HH PPS REVENUE CREDIT

## 2020-08-22 PROCEDURE — 3331090001 HH PPS REVENUE CREDIT

## 2020-08-22 PROCEDURE — 3331090002 HH PPS REVENUE DEBIT

## 2020-08-23 PROCEDURE — 3331090001 HH PPS REVENUE CREDIT

## 2020-08-23 PROCEDURE — 3331090002 HH PPS REVENUE DEBIT

## 2020-08-24 ENCOUNTER — HOME CARE VISIT (OUTPATIENT)
Dept: SCHEDULING | Facility: HOME HEALTH | Age: 85
End: 2020-08-24
Payer: MEDICARE

## 2020-08-24 ENCOUNTER — HOME CARE VISIT (OUTPATIENT)
Dept: HOME HEALTH SERVICES | Facility: HOME HEALTH | Age: 85
End: 2020-08-24
Payer: MEDICARE

## 2020-08-24 ENCOUNTER — HOSPITAL ENCOUNTER (EMERGENCY)
Age: 85
Discharge: HOME | End: 2020-08-24
Attending: EMERGENCY MEDICINE | Admitting: EMERGENCY MEDICINE
Payer: COMMERCIAL

## 2020-08-24 ENCOUNTER — APPOINTMENT (OUTPATIENT)
Dept: CT IMAGING | Age: 85
End: 2020-08-24
Attending: EMERGENCY MEDICINE
Payer: COMMERCIAL

## 2020-08-24 VITALS
BODY MASS INDEX: 22.32 KG/M2 | SYSTOLIC BLOOD PRESSURE: 136 MMHG | RESPIRATION RATE: 18 BRPM | OXYGEN SATURATION: 96 % | DIASTOLIC BLOOD PRESSURE: 58 MMHG | TEMPERATURE: 98.6 F | HEIGHT: 66 IN | HEART RATE: 86 BPM | WEIGHT: 138.89 LBS

## 2020-08-24 VITALS
OXYGEN SATURATION: 96 % | SYSTOLIC BLOOD PRESSURE: 120 MMHG | DIASTOLIC BLOOD PRESSURE: 62 MMHG | RESPIRATION RATE: 18 BRPM | TEMPERATURE: 97.6 F | HEART RATE: 77 BPM

## 2020-08-24 DIAGNOSIS — S09.90XA INJURY OF HEAD, INITIAL ENCOUNTER: ICD-10-CM

## 2020-08-24 DIAGNOSIS — S01.01XA LACERATION OF SCALP, INITIAL ENCOUNTER: Primary | ICD-10-CM

## 2020-08-24 PROCEDURE — 3331090001 HH PPS REVENUE CREDIT

## 2020-08-24 PROCEDURE — 99283 EMERGENCY DEPT VISIT LOW MDM: CPT

## 2020-08-24 PROCEDURE — G0300 HHS/HOSPICE OF LPN EA 15 MIN: HCPCS

## 2020-08-24 PROCEDURE — 75810000293 HC SIMP/SUPERF WND  RPR

## 2020-08-24 PROCEDURE — 3331090002 HH PPS REVENUE DEBIT

## 2020-08-24 PROCEDURE — 70450 CT HEAD/BRAIN W/O DYE: CPT

## 2020-08-24 RX ORDER — LIDOCAINE HYDROCHLORIDE AND EPINEPHRINE 20; 10 MG/ML; UG/ML
5 INJECTION, SOLUTION INFILTRATION; PERINEURAL
Status: DISCONTINUED | OUTPATIENT
Start: 2020-08-24 | End: 2020-08-24 | Stop reason: HOSPADM

## 2020-08-24 NOTE — ED NOTES
Assumed care of patient. Patient in no apparent distress. Call bell within reach. Placed on monitor x2. Son at bedside. Will continue to monitor. Noted a bandage wound on her right leg that is being managed by the wound clinic and home health. Also an abrasion to back on head. Bleeding controlled at this time.  Denies HA    1158- Pt taken to CT

## 2020-08-24 NOTE — DISCHARGE INSTRUCTIONS
Patient Education        Cuts: Care Instructions  Your Care Instructions  A cut can happen anywhere on your body. Stitches, staples, skin adhesives, or pieces of tape called Steri-Strips are sometimes used to keep the edges of a cut together and help it heal. Steri-Strips can be used by themselves or with stitches or staples. Sometimes cuts are left open. If the cut went deep and through the skin, the doctor may have closed the cut in two layers. A deeper layer of stitches brings the deep part of the cut together. These stitches will dissolve and don't need to be removed. The upper layer closure, which could be stitches, staples, Steri-Strips, or adhesive, is what you see on the cut. A cut is often covered by a bandage. The doctor has checked you carefully, but problems can develop later. If you notice any problems or new symptoms, get medical treatment right away. Follow-up care is a key part of your treatment and safety. Be sure to make and go to all appointments, and call your doctor if you are having problems. It's also a good idea to know your test results and keep a list of the medicines you take. How can you care for yourself at home? If a cut is open or closed  · Prop up the sore area on a pillow anytime you sit or lie down during the next 3 days. Try to keep it above the level of your heart. This will help reduce swelling. · Keep the cut dry for the first 24 to 48 hours. After this, you can shower if your doctor okays it. Pat the cut dry. · Don't soak the cut, such as in a bathtub. Your doctor will tell you when it's safe to get the cut wet. · After the first 24 to 48 hours, clean the cut with soap and water 2 times a day unless your doctor gives you different instructions. ? Don't use hydrogen peroxide or alcohol, which can slow healing. ? You may cover the cut with a thin layer of petroleum jelly and a nonstick bandage.   ? If the doctor put a bandage over the cut, put on a new bandage after cleaning the cut or if the bandage gets wet or dirty. · Avoid any activity that could cause your cut to reopen. · Be safe with medicines. Read and follow all instructions on the label. ? If the doctor gave you a prescription medicine for pain, take it as prescribed. ? If you are not taking a prescription pain medicine, ask your doctor if you can take an over-the-counter medicine. If the cut is closed with stitches, staples, or Steri-Strips  · Follow the above instructions for open or closed cuts. · Do not remove the stitches or staples on your own. Your doctor will tell you when to come back to have the stitches or staples removed. · Leave Steri-Strips on until they fall off. If the cut is closed with a skin adhesive  · Follow the above instructions for open or closed cuts. · Leave the skin adhesive on your skin until it falls off on its own. This may take 5 to 10 days. · Do not scratch, rub, or pick at the adhesive. · Do not put the sticky part of a bandage directly on the adhesive. · Do not put any kind of ointment, cream, or lotion over the area. This can make the adhesive fall off too soon. Do not use hydrogen peroxide or alcohol, which can slow healing. When should you call for help? Call your doctor now or seek immediate medical care if:  · You have new pain, or your pain gets worse. · The skin near the cut is cold or pale or changes color. · You have tingling, weakness, or numbness near the cut. · The cut starts to bleed, and blood soaks through the bandage. Oozing small amounts of blood is normal.  · You have trouble moving the area near the cut. · You have symptoms of infection, such as:  ? Increased pain, swelling, warmth, or redness around the cut.  ? Red streaks leading from the cut.  ? Pus draining from the cut.  ? A fever. Watch closely for changes in your health, and be sure to contact your doctor if:  · The cut reopens. · You do not get better as expected.   Where can you learn more?  Go to http://www.gray.com/  Enter M735 in the search box to learn more about \"Cuts: Care Instructions. \"  Current as of: June 26, 2019               Content Version: 12.5  © 9582-4763 [employer]. Care instructions adapted under license by [employer] (which disclaims liability or warranty for this information). If you have questions about a medical condition or this instruction, always ask your healthcare professional. [place] [address] any warranty or liability for your use of this information. Patient Education        Learning About a Closed Head Injury  What is a closed head injury? A closed head injury happens when your head gets hit hard. The strong force of the blow causes your brain to shake in your skull. This movement can cause the brain to bruise, swell, or tear. Sometimes nerves or blood vessels also get damaged. This can cause bleeding in or around the brain. A concussion is a type of closed head injury. What are the symptoms? If you have a mild concussion, you may have a mild headache or feel \"not quite right. \" These symptoms are common. They usually go away over a few days to 4 weeks. But sometimes after a concussion, you feel like you can't function as well as before the injury. And you have new symptoms. This is called postconcussive syndrome. You may:  · Find it harder to solve problems, think, concentrate, or remember. · Have headaches. · Have changes in your sleep patterns, such as not being able to sleep or sleeping all the time. · Have changes in your personality. · Not be interested in your usual activities. · Feel angry or anxious without a clear reason. · Lose your sense of taste or smell. · Be dizzy, lightheaded, or unsteady. It may be hard to stand or walk. How is a closed head injury treated? Any person who may have a concussion needs to see a doctor.  Some people have to stay in the hospital to be watched. Others can go home safely. If you go home, follow your doctor's instructions. He or she will tell you if you need someone to watch you closely for the next 24 hours or longer. Rest is the best treatment. Get plenty of sleep at night. And try to rest during the day. · Avoid activities that are physically or mentally demanding. These include housework, exercise, and schoolwork. And don't play video games, send text messages, or use the computer. You may need to change your school or work schedule to be able to avoid these activities. · Ask your doctor when it's okay to drive, ride a bike, or operate machinery. · Take an over-the-counter pain medicine, such as acetaminophen (Tylenol), ibuprofen (Advil, Motrin), or naproxen (Aleve). Be safe with medicines. Read and follow all instructions on the label. · Check with your doctor before you use any other medicines for pain. · Do not drink alcohol or use illegal drugs. They can slow recovery. They can also increase your risk of getting a second head injury. Follow-up care is a key part of your treatment and safety. Be sure to make and go to all appointments, and call your doctor if you are having problems. It's also a good idea to know your test results and keep a list of the medicines you take. Where can you learn more? Go to http://franco-olivier.info/  Enter E235 in the search box to learn more about \"Learning About a Closed Head Injury. \"  Current as of: November 20, 2019               Content Version: 12.5  © 1751-8704 [employer]. Care instructions adapted under license by [employer] (which disclaims liability or warranty for this information). If you have questions about a medical condition or this instruction, always ask your healthcare professional. [address] any warranty or liability for your use of this information.

## 2020-08-25 PROCEDURE — 3331090001 HH PPS REVENUE CREDIT

## 2020-08-25 PROCEDURE — 3331090002 HH PPS REVENUE DEBIT

## 2020-08-25 NOTE — ED PROVIDER NOTES
EMERGENCY DEPARTMENT HISTORY AND PHYSICAL EXAM      Date: 8/24/2020  Patient Name: Sam Cameron    History of Presenting Illness     Chief Complaint   Patient presents with    Fall     Patient states she was bending over today, fell and hit her head on a dressor. Pt denies any HA, blurred vision, dizziness, sob, cp, cough, or fevers. Pt is on eliquis, and has a small bleeding skin tear on back of head. History Provided By: Patient and Patient's Son    HPI: Sam Cameron, 80 y.o. female with PMHx significant for arthritis, GERD, hypertension, presents to the ED with cc of head injury today. The patient reports that she was bending over, when she lost her balance and fell. She hit the back of her head against the handle of her dresser. Since then, she has had mild bleeding from the back of her scalp. She is on Eliquis, which prompted her to come to the emergency department today. She denies headache, blurred vision, loss of consciousness, numbness, weakness. There are no other complaints, changes, or physical findings at this time. PCP: Yu Hairston, DO    No current facility-administered medications on file prior to encounter. Current Outpatient Medications on File Prior to Encounter   Medication Sig Dispense Refill    amiodarone (CORDARONE) 200 mg tablet Take 200 mg by mouth every other day.  febuxostat (ULORIC) 80 mg tab tablet Take 80 mg by mouth daily.  torsemide (DEMADEX) 20 mg tablet Take 20 mg by mouth daily.  acetaminophen (TYLENOL) 500 mg tablet Take 1,000 mg by mouth every six (6) hours as needed for Pain.  traZODone (DESYREL) 50 mg tablet TAKE 1/2 TABLET BY MOUTH AT BEDTIME 45 Tab 3    acetaminophen (TylenoL) 325 mg tablet Take 650 mg by mouth every six (6) hours as needed for Pain.       levothyroxine (SYNTHROID) 88 mcg tablet TAKE 1 TABLET BY MOUTH EVERY DAY INDICATIONS: HYPOTHYROIDISM 90 Tab 3    apixaban (Eliquis) 2.5 mg tablet Eliquis 2.5 mg tablet   Take 1 tablet twice a day by oral route.  amiodarone (CORDARONE) 200 mg tablet TAKE 1 TABLET BY MOUTH TWICE A DAY      torsemide (DEMADEX) 20 mg tablet TAKE 1 TABLET BY MOUTH EVERY DAY      atorvastatin (LIPITOR) 10 mg tablet TAKE 1 TABLET BY MOUTH EVERY OTHER DAY 45 Tab 3    vit C/E/Zn/coppr/lutein/zeaxan (PRESERVISION AREDS-2 PO) Take 2 Tabs by mouth daily.  lansoprazole (PREVACID) 30 mg capsule TAKE 1 CAPSULE BY MOUTH EVERY DAY IN THE MORNING 90 Cap 3    escitalopram oxalate (LEXAPRO) 10 mg tablet TAKE 1 TABLET BY MOUTH EVERY DAY 90 Tab 3    carvedilol (COREG) 3.125 mg tablet TAKE 1 TABLET BY MOUTH TWICE A DAY  3    losartan (COZAAR) 100 mg tablet TAKE 1 TABLET EVERY DAY  3       Past History     Past Medical History:  Past Medical History:   Diagnosis Date    Arthritis     GERD (gastroesophageal reflux disease)     Hypertension     Psychiatric disorder     Thyroid disease        Past Surgical History:  History reviewed. No pertinent surgical history. Family History:  Family History   Problem Relation Age of Onset    Hypertension Mother     Heart Disease Mother     Cancer Maternal Aunt         breast       Social History:  Social History     Tobacco Use    Smoking status: Never Smoker    Smokeless tobacco: Never Used   Substance Use Topics    Alcohol use: Yes     Alcohol/week: 1.0 standard drinks     Types: 1 Glasses of wine per week     Comment: occasional    Drug use: Yes     Types: Prescription       Allergies: Allergies   Allergen Reactions    Augmentin [Amoxicillin-Pot Clavulanate] Rash    Bextra [Valdecoxib] Hives     No longer allergic         Review of Systems   Review of Systems   Constitutional: Negative for chills and fever. HENT: Negative for ear pain and sore throat. Eyes: Negative for redness and visual disturbance. Respiratory: Negative for cough and shortness of breath. Cardiovascular: Negative for chest pain and palpitations. Gastrointestinal: Negative for abdominal pain, nausea and vomiting. Genitourinary: Negative for dysuria and hematuria. Musculoskeletal: Negative for back pain and gait problem. Skin: Positive for wound. Negative for rash. Neurological: Negative for dizziness and headaches. Psychiatric/Behavioral: Negative for behavioral problems and confusion. All other systems reviewed and are negative. Physical Exam   Physical Exam  Vitals signs and nursing note reviewed. Constitutional:       Appearance: She is well-developed. HENT:      Head: Normocephalic and atraumatic. Eyes:      Conjunctiva/sclera: Conjunctivae normal.      Pupils: Pupils are equal, round, and reactive to light. Neck:      Musculoskeletal: Normal range of motion and neck supple. Cardiovascular:      Rate and Rhythm: Normal rate and regular rhythm. Heart sounds: Normal heart sounds. Pulmonary:      Effort: Pulmonary effort is normal.      Breath sounds: Normal breath sounds. Musculoskeletal: Normal range of motion. Skin:     General: Skin is warm and dry. Comments: 2 cm linear laceration to the posterior scalp. Bleeding is controlled. Neurological:      Mental Status: She is alert and oriented to person, place, and time. GCS: GCS eye subscore is 4. GCS verbal subscore is 5. GCS motor subscore is 6. Cranial Nerves: No cranial nerve deficit. Sensory: No sensory deficit. Psychiatric:         Behavior: Behavior normal.           Diagnostic Study Results     Labs -   No results found for this or any previous visit (from the past 12 hour(s)). Radiologic Studies -   CT HEAD WO CONT   Final Result   IMPRESSION:    No acute abnormality identified. CT Results  (Last 48 hours)               08/24/20 1208  CT HEAD WO CONT Final result    Impression:  IMPRESSION:    No acute abnormality identified.                Narrative:  EXAM: CT HEAD WO CONT       INDICATION: fall       COMPARISON: None.       CONTRAST: None. TECHNIQUE: Unenhanced CT of the head was performed using 5 mm images. Brain and   bone windows were generated. Coronal and sagittal reformats. CT dose reduction   was achieved through use of a standardized protocol tailored for this   examination and automatic exposure control for dose modulation. FINDINGS:   There is mild prominence of the ventricles and sulci. There are mild changes   small vessel disease periventricular white matter. 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 No hemorrhage mass or acute infarction is identified. The bone windows demonstrate no abnormalities. The visualized portions of the   paranasal sinuses and mastoid air cells are clear. CXR Results  (Last 48 hours)    None            Medical Decision Making   I am the first provider for this patient. I reviewed the vital signs, available nursing notes, past medical history, past surgical history, family history and social history. Vital Signs-Reviewed the patient's vital signs. Patient Vitals for the past 12 hrs:   Temp Pulse Resp BP SpO2   08/24/20 1145    136/58 96 %   08/24/20 1136 98.6 °F (37 °C) 86 18 132/50 96 %         Records Reviewed: Nursing Notes and Old Medical Records      Provider Notes (Medical Decision Making):   DDx: Traumatic intracranial hemorrhage, skull fracture, laceration    Plan for CT of the head and primary closure of laceration. ED Course:   Initial assessment performed. The patients presenting problems have been discussed, and they are in agreement with the care plan formulated and outlined with them. I have encouraged them to ask questions as they arise throughout their visit. Procedure Note - Laceration Repair:  1:20 PM  Procedure by CARL Farrell. Complexity: simple  2 cm linear laceration to scalp  was irrigated copiously with NS under jet lavage, prepped with Chlorprep and draped in a sterile fashion.   The area was anesthetized with 2 mLs of  Lidocaine 2% with epinephrine via local infiltration. The wound was explored with the following results: No foreign bodies found. The wound was repaired with One layer suture closure: Skin Layer:  5 sutures placed, stitch type:simple interrupted, suture: 6-0 fast-gut. .  The wound was closed with good hemostasis and approximation. Sterile dressing applied. Estimated blood loss: minimal  The procedure took 16-30 minutes, and pt tolerated well. Disposition:  1:40 PM  The patient has been re-evaluated and is ready for discharge. Reviewed available results with patient. Counseled patient on diagnosis and care plan. Patient has expressed understanding, and all questions have been answered. Patient agrees with plan and agrees to follow up as recommended, or to return to the ED if their symptoms worsen. Discharge instructions have been provided and explained to the patient, along with reasons to return to the ED. PLAN:  1. Discharge Medication List as of 8/24/2020  1:40 PM        2. Follow-up Information     Follow up With Specialties Details Why Manjeet Gutierrez DO Internal Medicine Schedule an appointment as soon as possible for a visit in 1 week for a recheck [facility]  [phone]      [facility] EMERGENCY DEPT Emergency Medicine Go to If symptoms worsen [address] [place] [address]  [phone]        Return to ED if worse     Diagnosis     Clinical Impression:   1. Laceration of scalp, initial encounter    2. Injury of head, initial encounter            Artie Curry.  DISHA Levine

## 2020-08-26 ENCOUNTER — HOME CARE VISIT (OUTPATIENT)
Dept: SCHEDULING | Facility: HOME HEALTH | Age: 85
End: 2020-08-26
Payer: MEDICARE

## 2020-08-26 VITALS
DIASTOLIC BLOOD PRESSURE: 64 MMHG | SYSTOLIC BLOOD PRESSURE: 122 MMHG | HEART RATE: 74 BPM | RESPIRATION RATE: 16 BRPM | OXYGEN SATURATION: 93 % | TEMPERATURE: 97.5 F

## 2020-08-26 PROCEDURE — 3331090001 HH PPS REVENUE CREDIT

## 2020-08-26 PROCEDURE — G0300 HHS/HOSPICE OF LPN EA 15 MIN: HCPCS

## 2020-08-26 PROCEDURE — 3331090002 HH PPS REVENUE DEBIT

## 2020-08-27 ENCOUNTER — HOME CARE VISIT (OUTPATIENT)
Dept: HOME HEALTH SERVICES | Facility: HOME HEALTH | Age: 85
End: 2020-08-27
Payer: MEDICARE

## 2020-08-27 ENCOUNTER — HOME CARE VISIT (OUTPATIENT)
Dept: SCHEDULING | Facility: HOME HEALTH | Age: 85
End: 2020-08-27
Payer: MEDICARE

## 2020-08-27 VITALS
OXYGEN SATURATION: 95 % | DIASTOLIC BLOOD PRESSURE: 74 MMHG | TEMPERATURE: 98.6 F | SYSTOLIC BLOOD PRESSURE: 120 MMHG | HEART RATE: 79 BPM | RESPIRATION RATE: 17 BRPM

## 2020-08-27 PROCEDURE — G0157 HHC PT ASSISTANT EA 15: HCPCS

## 2020-08-27 PROCEDURE — 3331090002 HH PPS REVENUE DEBIT

## 2020-08-27 PROCEDURE — 3331090001 HH PPS REVENUE CREDIT

## 2020-08-28 ENCOUNTER — HOME CARE VISIT (OUTPATIENT)
Dept: SCHEDULING | Facility: HOME HEALTH | Age: 85
End: 2020-08-28
Payer: MEDICARE

## 2020-08-28 VITALS
SYSTOLIC BLOOD PRESSURE: 122 MMHG | TEMPERATURE: 97.6 F | DIASTOLIC BLOOD PRESSURE: 78 MMHG | HEART RATE: 77 BPM | RESPIRATION RATE: 18 BRPM | OXYGEN SATURATION: 94 %

## 2020-08-28 PROCEDURE — G0300 HHS/HOSPICE OF LPN EA 15 MIN: HCPCS

## 2020-08-28 PROCEDURE — 3331090002 HH PPS REVENUE DEBIT

## 2020-08-28 PROCEDURE — 3331090001 HH PPS REVENUE CREDIT

## 2020-08-29 PROCEDURE — 3331090001 HH PPS REVENUE CREDIT

## 2020-08-29 PROCEDURE — 3331090002 HH PPS REVENUE DEBIT

## 2020-08-30 PROCEDURE — 3331090001 HH PPS REVENUE CREDIT

## 2020-08-30 PROCEDURE — 3331090002 HH PPS REVENUE DEBIT

## 2020-08-31 ENCOUNTER — HOME CARE VISIT (OUTPATIENT)
Dept: SCHEDULING | Facility: HOME HEALTH | Age: 85
End: 2020-08-31
Payer: MEDICARE

## 2020-08-31 VITALS
OXYGEN SATURATION: 96 % | HEART RATE: 73 BPM | DIASTOLIC BLOOD PRESSURE: 70 MMHG | RESPIRATION RATE: 18 BRPM | SYSTOLIC BLOOD PRESSURE: 118 MMHG | TEMPERATURE: 97.4 F

## 2020-08-31 PROCEDURE — 3331090001 HH PPS REVENUE CREDIT

## 2020-08-31 PROCEDURE — G0300 HHS/HOSPICE OF LPN EA 15 MIN: HCPCS

## 2020-08-31 PROCEDURE — 3331090002 HH PPS REVENUE DEBIT

## 2020-09-01 ENCOUNTER — HOSPITAL ENCOUNTER (OUTPATIENT)
Dept: WOUND CARE | Age: 85
Discharge: HOME | End: 2020-09-01
Admitting: PODIATRIST
Payer: COMMERCIAL

## 2020-09-01 VITALS
SYSTOLIC BLOOD PRESSURE: 157 MMHG | DIASTOLIC BLOOD PRESSURE: 67 MMHG | TEMPERATURE: 97.3 F | RESPIRATION RATE: 18 BRPM | HEART RATE: 66 BPM

## 2020-09-01 PROCEDURE — 99213 OFFICE O/P EST LOW 20 MIN: CPT

## 2020-09-01 PROCEDURE — 3331090002 HH PPS REVENUE DEBIT

## 2020-09-01 PROCEDURE — 3331090001 HH PPS REVENUE CREDIT

## 2020-09-01 NOTE — WOUND CARE
09/01/20 1354 Wound Foot Dorsal # 2 Date First Assessed/Time First Assessed: 07/07/20 1607   Primary Wound Type: Blister/bullae  Location: Foot  Wound Location Orientation: Dorsal  Wound Description: # 2 Dressing Status Removed Dressing Type Gauze;Special tape (comment) (Aquacel Ag) Wound Length (cm) 0.3 cm Wound Width (cm) 0.2 cm Wound Depth (cm) 0.2 cm Wound Surface Area (cm^2) 0.06 cm^2 Wound Volume (cm^3) 0.01 cm^3 Condition of Base Pink Condition of Edges Open Drainage Amount Small Drainage Color Serous Wound Odor None Dian-wound Assessment Intact Cleansing and Cleansing Agents  Normal saline Wound Foot Right;Medial # 1 Date First Assessed/Time First Assessed: 06/09/20 1510   Present on Hospital Admission: Yes  Wound Approximate Age at First Assessment (Weeks): 2 weeks  Primary Wound Type: Blister/bullae  Location: Foot  Wound Location Orientation: Right;Medial  Woun. .. Dressing Status Removed Dressing Type Gauze;Special tape (comment) (Aquacel Ag) Wound Length (cm) 0.2 cm Wound Width (cm) 0.2 cm Wound Depth (cm) 0.2 cm Wound Surface Area (cm^2) 0.04 cm^2 Wound Volume (cm^3) 0.01 cm^3 Change in Wound Size % 99.23 Condition of Base Pink Condition of Edges Open Drainage Amount Small Drainage Color Serous Wound Odor None Dian-wound Assessment Intact Cleansing and Cleansing Agents  Normal saline Visit Vitals /67 (BP 1 Location: Right arm, BP Patient Position: At rest) Pulse 66 Temp 97.3 °F (36.3 °C) Resp 18 RLE Peripheral Vascular Capillary Refill: Less than/equal to 3 seconds (09/01/20 1353) Color: Appropriate for race (09/01/20 1353) Temperature: Warm (09/01/20 1353) Sensation: Present (09/01/20 1353) Pedal Pulse: Doppler (09/01/20 1353) Circumference of Calf (cm): 32.8 cm (09/01/20 1353) Location of Measurement (Calf): Mid  (09/01/20 1353) Circumference of Ankle (cm): 26 cm (09/01/20 1353) Location of Measurement (Ankle): Upper  (09/01/20 2160)

## 2020-09-01 NOTE — PROGRESS NOTES
Patient presents to wound clinic for: Riley Lux is a 80 y.o. female who presents for initial wound care of the following: medial right first metatarsophalangeal joint wound. Patient is accompanied at today's appointment by her son who helps relate much of her history. Patient has a history of gout with multiple flare-ups as well as a history of chronic kidney disease. Several months ago, she was started on allopurinol and developed an allergic reaction to it and developed a rash all over as a result, which was particularly worse on the extremities. The allopurinol was discontinued. She then had another gout flareup and patient was started on a prednisone dose pack. Home health care has been doing her dressing changes. Has been having more swelling in the lower extremities. Notes that she has been having swelling in the lower extremities but that she has not been elevating her legs as much as she should. No pain in the feet. Patient notes that she is moving to [place] in 3 weeks. Denies f/c/n/v/d. Pertinent Medical History:  Past Medical History:   Diagnosis Date    Arthritis     GERD (gastroesophageal reflux disease)     Hypertension     Psychiatric disorder     Thyroid disease      No past surgical history on file. Prior to Admission medications    Medication Sig Start Date End Date Taking? Authorizing Provider   amiodarone (CORDARONE) 200 mg tablet Take 200 mg by mouth every other day. Yes Provider, Historical   febuxostat (ULORIC) 80 mg tab tablet Take 80 mg by mouth daily. Yes Provider, Historical   traZODone (DESYREL) 50 mg tablet TAKE 1/2 TABLET BY MOUTH AT BEDTIME 6/18/20  Yes Job Arroyo III, DO   levothyroxine (SYNTHROID) 88 mcg tablet TAKE 1 TABLET BY MOUTH EVERY DAY INDICATIONS: HYPOTHYROIDISM 5/31/20  Yes Job Arroyo III, DO   apixaban (Eliquis) 2.5 mg tablet Eliquis 2.5 mg tablet   Take 1 tablet twice a day by oral route.    Yes Provider, Historical   torsemide (DEMADEX) 20 mg tablet TAKE 1 TABLET BY MOUTH EVERY DAY 4/14/20  Yes Provider, Historical   atorvastatin (LIPITOR) 10 mg tablet TAKE 1 TABLET BY MOUTH EVERY OTHER DAY 3/8/20  Yes Job Arroyo III, DO   vit C/E/Zn/coppr/lutein/zeaxan (PRESERVISION AREDS-2 PO) Take 2 Tabs by mouth daily. Yes Provider, Historical   lansoprazole (PREVACID) 30 mg capsule TAKE 1 CAPSULE BY MOUTH EVERY DAY IN THE MORNING 1/29/20  Yes Job Arroyo III, DO   escitalopram oxalate (LEXAPRO) 10 mg tablet TAKE 1 TABLET BY MOUTH EVERY DAY 1/19/20  Yes Job Arroyo III, DO   carvedilol (COREG) 3.125 mg tablet TAKE 1 TABLET BY MOUTH TWICE A DAY 11/19/18  Yes Provider, Historical   losartan (COZAAR) 100 mg tablet TAKE 1 TABLET EVERY DAY 12/5/18  Yes Provider, Historical   torsemide (DEMADEX) 20 mg tablet Take 20 mg by mouth daily. Provider, Historical   acetaminophen (TYLENOL) 500 mg tablet Take 1,000 mg by mouth every six (6) hours as needed for Pain. Provider, Historical   acetaminophen (TylenoL) 325 mg tablet Take 650 mg by mouth every six (6) hours as needed for Pain.     Provider, Historical   amiodarone (CORDARONE) 200 mg tablet TAKE 1 TABLET BY MOUTH TWICE A DAY 2/22/20   Provider, Historical     Allergies   Allergen Reactions    Augmentin [Amoxicillin-Pot Clavulanate] Rash    Bextra [Valdecoxib] Hives     No longer allergic     Family History   Problem Relation Age of Onset    Hypertension Mother     Heart Disease Mother     Cancer Maternal Aunt         breast     Social History     Socioeconomic History    Marital status:      Spouse name: Not on file    Number of children: Not on file    Years of education: Not on file    Highest education level: Not on file   Occupational History    Not on file   Social Needs    Financial resource strain: Not on file    Food insecurity     Worry: Not on file     Inability: Not on file    Transportation needs     Medical: Not on file     Non-medical: Not on file   Tobacco Use    Smoking status: Never Smoker    Smokeless tobacco: Never Used   Substance and Sexual Activity    Alcohol use: Yes     Alcohol/week: 1.0 standard drinks     Types: 1 Glasses of wine per week     Comment: occasional    Drug use: Yes     Types: Prescription    Sexual activity: Not Currently   Lifestyle    Physical activity     Days per week: Not on file     Minutes per session: Not on file    Stress: Not on file   Relationships    Social connections     Talks on phone: Not on file     Gets together: Not on file     Attends Alevism service: Not on file     Active member of club or organization: Not on file     Attends meetings of clubs or organizations: Not on file     Relationship status: Not on file    Intimate partner violence     Fear of current or ex partner: Not on file     Emotionally abused: Not on file     Physically abused: Not on file     Forced sexual activity: Not on file   Other Topics Concern    Not on file   Social History Narrative    Not on file       Vitals:    09/01/20 1351   BP: 157/67   Pulse: 66   Resp: 18   Temp: 97.3 °F (36.3 °C)       Review of Systems:    Gen: No fever, chills, malaise, weight loss/gain. Heent: No headache, rhinorrhea, epistaxis, ear pain, hearing loss, sinus pain, neck pain/stiffness, sore throat. Heart: No chest pain, palpitations, ARTEAGA, pnd, or orthopnea. Resp: No cough, hemoptysis, wheezing and shortness of breath. GI: No nausea, vomiting, diarrhea, constipation, melena or hematochezia. : No urinary obstruction, dysuria or hematuria. Derm: see below  Musc/skeletal: no bone or joint complains. Vasc: No edema, cyanosis or claudication. Endo: No heat/cold intolerance, no polyuria,polydipsia or polyphagia. Neuro: No unilateral weakness, numbness, tingling. No seizures. Heme: No easy bruising or bleeding.     Wound Description:   Wound Type: Blister/Gout-Medial 1st MPJ   Indication: Chronic > 30days   Status: healed    The medial 1st MPJ wound is now fully epithelialized/healed. Wound Type: Blister/Gout-Dorsal 1st MPJ   Indication: Chronic > 30days   Status: improving   Measurements:    Wound Length:  0.3      Wound Width : 0.2      Wound Depth : 0.1   Tissue Type:   Epithelial: without necrosis  Granulation: without necrosis; hypergranular  Subcutaneous: NA  Slough: NA  Eschar: NA  Debridement:  Not required today to promoted wound healing    Infection:no     Edema:NA    Lower Extremity Circulation   Palpable pulses    Assessment:   1. Ulcer of right medial 1st MPJ with exposed bone  2. Gout    Plan:   -Medial 1st MPJ wound is now healed. The dorsal foot wound is now healed.   -Intrasite gel, aquacel, gauze to the dorsal wounds until it is healed. -Double tubigrips to the lower extremities. -As patient is moving to [place] and the medial 1st MPJ wound is healed and dorsal wound is nearly healed, patient is discharged from the wound center. Confirmed with patient's son (with whom she will be living) that she has enough dressing supplies for the next 3 weeks as I suspect the dorsal foot wound will be healed within that time frame. If not patient is to establish care with a wound care physician in [place].

## 2020-09-01 NOTE — DISCHARGE INSTRUCTIONS
Discharge Instructions for  [facility]  [address], [address] [facility]  Telephone: [phone]    NAME:  Cory Reyes OF BIRTH:  5/24/1926  MEDICAL RECORD NUMBER:  [id number]  DATE:  9/1/2020      WOUND CARE ORDERS:  Right dorsal foot wound - cleanse with saline, apply Intrasite gel, fill defect with silver alginate, cover with gauze, secure with roll gauze, Change 3 x week. Right medial foot wound-Patient has no open wound. protectect with Gauze.   apply double layer tubigrip size F. No appointment required. TREATMENT ORDERS:    Elevate leg(s) above the level of the heart when sitting. Avoid prolonged standing in one place. Do no get dressing/wrap wet. Follow Diet as prescribed:   [x] Diet as tolerated: [] Calorie Diabetic Diet: [x] No Added Salt:  [] Increase Protein: [] Limit the amount of liquid you are drinking and avoid drinking in between meals               Return Appointment:  [x] Return Appointment: no appoint ment required  [] Nurse Visit : *** days  [] Ordered tests:      Electronically signed Beth Hurley RN on 9/1/2020 at 2:37 PM     Carrie Byrnes 281: Should you experience any significant changes in your wound(s) or have questions about your wound care, please contact the [address] [place] [address] at [address] [place] [address] 8:00 am - 4:30. If you need help with your wound outside these hours and cannot wait until we are again available, contact your PCP or go to the hospital emergency room. PLEASE NOTE: IF YOU ARE UNABLE TO OBTAIN WOUND SUPPLIES, CONTINUE TO USE THE SUPPLIES YOU HAVE AVAILABLE UNTIL YOU ARE ABLE TO REACH US. IT IS MOST IMPORTANT TO KEEP THE WOUND COVERED AT ALL TIMES.      Physician Signature:_______________________    Date: ___________ Time:  ____________

## 2020-09-02 ENCOUNTER — HOME CARE VISIT (OUTPATIENT)
Dept: SCHEDULING | Facility: HOME HEALTH | Age: 85
End: 2020-09-02
Payer: MEDICARE

## 2020-09-02 PROCEDURE — 3331090002 HH PPS REVENUE DEBIT

## 2020-09-02 PROCEDURE — 3331090001 HH PPS REVENUE CREDIT

## 2020-09-03 PROCEDURE — 3331090001 HH PPS REVENUE CREDIT

## 2020-09-03 PROCEDURE — 3331090002 HH PPS REVENUE DEBIT

## 2020-09-04 ENCOUNTER — HOME CARE VISIT (OUTPATIENT)
Dept: SCHEDULING | Facility: HOME HEALTH | Age: 85
End: 2020-09-04
Payer: MEDICARE

## 2020-09-04 ENCOUNTER — HOME CARE VISIT (OUTPATIENT)
Dept: HOME HEALTH SERVICES | Facility: HOME HEALTH | Age: 85
End: 2020-09-04
Payer: MEDICARE

## 2020-09-04 PROCEDURE — G0151 HHCP-SERV OF PT,EA 15 MIN: HCPCS

## 2020-09-04 PROCEDURE — 3331090003 HH PPS REVENUE ADJ

## 2020-09-04 PROCEDURE — 3331090002 HH PPS REVENUE DEBIT

## 2020-09-04 PROCEDURE — 400014 HH F/U

## 2020-09-04 PROCEDURE — 3331090001 HH PPS REVENUE CREDIT

## 2020-09-05 PROCEDURE — 3331090002 HH PPS REVENUE DEBIT

## 2020-09-05 PROCEDURE — 3331090001 HH PPS REVENUE CREDIT

## 2020-09-06 PROCEDURE — 3331090001 HH PPS REVENUE CREDIT

## 2020-09-06 PROCEDURE — 3331090002 HH PPS REVENUE DEBIT

## 2020-09-07 VITALS
HEART RATE: 72 BPM | SYSTOLIC BLOOD PRESSURE: 120 MMHG | TEMPERATURE: 97.8 F | RESPIRATION RATE: 16 BRPM | OXYGEN SATURATION: 99 % | DIASTOLIC BLOOD PRESSURE: 72 MMHG

## 2020-09-07 PROCEDURE — 3331090001 HH PPS REVENUE CREDIT

## 2020-09-07 PROCEDURE — 3331090002 HH PPS REVENUE DEBIT

## 2020-09-08 PROCEDURE — 3331090002 HH PPS REVENUE DEBIT

## 2020-09-08 PROCEDURE — 3331090001 HH PPS REVENUE CREDIT

## 2020-09-09 PROCEDURE — 3331090001 HH PPS REVENUE CREDIT

## 2020-09-09 PROCEDURE — 3331090002 HH PPS REVENUE DEBIT

## 2020-09-10 PROCEDURE — 3331090001 HH PPS REVENUE CREDIT

## 2020-09-10 PROCEDURE — 3331090002 HH PPS REVENUE DEBIT

## 2020-09-11 PROCEDURE — 3331090002 HH PPS REVENUE DEBIT

## 2020-09-11 PROCEDURE — 3331090001 HH PPS REVENUE CREDIT

## 2020-09-12 PROCEDURE — 3331090002 HH PPS REVENUE DEBIT

## 2020-09-12 PROCEDURE — 3331090001 HH PPS REVENUE CREDIT

## 2020-09-13 PROCEDURE — 3331090001 HH PPS REVENUE CREDIT

## 2020-09-13 PROCEDURE — 3331090002 HH PPS REVENUE DEBIT

## 2020-09-14 PROCEDURE — 3331090001 HH PPS REVENUE CREDIT

## 2020-09-14 PROCEDURE — 3331090002 HH PPS REVENUE DEBIT

## 2020-09-15 PROCEDURE — 3331090001 HH PPS REVENUE CREDIT

## 2020-09-15 PROCEDURE — 3331090002 HH PPS REVENUE DEBIT

## 2020-09-16 PROCEDURE — 3331090001 HH PPS REVENUE CREDIT

## 2020-09-16 PROCEDURE — 3331090002 HH PPS REVENUE DEBIT

## 2020-09-17 PROCEDURE — 3331090001 HH PPS REVENUE CREDIT

## 2020-09-17 PROCEDURE — 3331090002 HH PPS REVENUE DEBIT

## 2020-09-18 PROCEDURE — 3331090002 HH PPS REVENUE DEBIT

## 2020-09-18 PROCEDURE — 3331090001 HH PPS REVENUE CREDIT

## 2020-09-19 PROCEDURE — 3331090002 HH PPS REVENUE DEBIT

## 2020-09-19 PROCEDURE — 3331090001 HH PPS REVENUE CREDIT

## 2020-09-20 PROCEDURE — 3331090002 HH PPS REVENUE DEBIT

## 2020-09-20 PROCEDURE — 3331090001 HH PPS REVENUE CREDIT

## 2020-09-21 PROCEDURE — 3331090001 HH PPS REVENUE CREDIT

## 2020-09-21 PROCEDURE — 3331090002 HH PPS REVENUE DEBIT

## 2020-09-22 PROCEDURE — 3331090001 HH PPS REVENUE CREDIT

## 2020-09-22 PROCEDURE — 3331090002 HH PPS REVENUE DEBIT

## 2020-09-23 PROCEDURE — 3331090002 HH PPS REVENUE DEBIT

## 2020-09-23 PROCEDURE — 3331090001 HH PPS REVENUE CREDIT

## 2020-09-24 PROCEDURE — 3331090002 HH PPS REVENUE DEBIT

## 2020-09-24 PROCEDURE — 3331090001 HH PPS REVENUE CREDIT

## 2020-09-25 PROCEDURE — 3331090002 HH PPS REVENUE DEBIT

## 2020-09-25 PROCEDURE — 3331090001 HH PPS REVENUE CREDIT

## 2020-09-26 PROCEDURE — 3331090001 HH PPS REVENUE CREDIT

## 2020-09-26 PROCEDURE — 3331090002 HH PPS REVENUE DEBIT

## 2020-09-27 PROCEDURE — 3331090001 HH PPS REVENUE CREDIT

## 2020-09-27 PROCEDURE — 3331090002 HH PPS REVENUE DEBIT

## 2020-09-28 PROCEDURE — 3331090001 HH PPS REVENUE CREDIT

## 2020-09-28 PROCEDURE — 3331090002 HH PPS REVENUE DEBIT

## 2020-09-29 PROCEDURE — 3331090001 HH PPS REVENUE CREDIT

## 2020-09-29 PROCEDURE — 3331090002 HH PPS REVENUE DEBIT

## 2020-09-30 PROCEDURE — 3331090001 HH PPS REVENUE CREDIT

## 2020-09-30 PROCEDURE — 3331090002 HH PPS REVENUE DEBIT

## 2020-10-07 ENCOUNTER — HOME CARE VISIT (OUTPATIENT)
Dept: HOME HEALTH SERVICES | Facility: HOME HEALTH | Age: 85
End: 2020-10-07
Payer: MEDICARE

## 2020-11-25 ENCOUNTER — TELEPHONE (OUTPATIENT)
Dept: INTERNAL MEDICINE CLINIC | Age: 85
End: 2020-11-25

## 2022-03-18 PROBLEM — M10.371 ACUTE GOUT DUE TO RENAL IMPAIRMENT INVOLVING RIGHT FOOT: Status: ACTIVE | Noted: 2020-07-07

## 2022-03-18 PROBLEM — J90 PLEURAL EFFUSION: Status: ACTIVE | Noted: 2017-08-02

## 2022-03-18 PROBLEM — R91.8 PULMONARY NODULES: Status: ACTIVE | Noted: 2017-08-02

## 2022-03-19 PROBLEM — L97.412 ULCER OF RIGHT MIDFOOT WITH FAT LAYER EXPOSED (HCC): Status: ACTIVE | Noted: 2020-06-09

## 2022-03-19 PROBLEM — Z95.0 S/P BIVENTRICULAR CARDIAC PACEMAKER PROCEDURE: Status: ACTIVE | Noted: 2018-02-27

## 2022-03-20 PROBLEM — M1A.3711: Status: ACTIVE | Noted: 2020-06-09

## 2022-09-14 NOTE — PROGRESS NOTES
09/16/2022-PT [facility]    09/15/2022-PT [facility] 25 09/14/2022-PT IN HOSPITAL  10/05/2022 APT W/CT HEAD     Nelda Forrest PA-C   2 week hospital follow up with AP and CT head Patient having some weeping on L lower leg, clear drainage and no redness noted. Lower pant leg and slipper were wet at today's visit. Pt states she has been noticing it for a couple weeks but has never reported to myself. Also noted some increased SOB with amb last 2 visits but O2 levels remainded >95% throughout session.   Pt thinks she will be moving to [place] to be closer to [facility] some at some point in near future (possibly en d of September)

## 2023-05-06 RX ORDER — AMIODARONE HYDROCHLORIDE 200 MG/1
200 TABLET ORAL EVERY OTHER DAY
COMMUNITY